# Patient Record
Sex: FEMALE | Race: AMERICAN INDIAN OR ALASKA NATIVE | NOT HISPANIC OR LATINO | Employment: FULL TIME | URBAN - METROPOLITAN AREA
[De-identification: names, ages, dates, MRNs, and addresses within clinical notes are randomized per-mention and may not be internally consistent; named-entity substitution may affect disease eponyms.]

---

## 2017-03-23 ENCOUNTER — HOSPITAL ENCOUNTER (OUTPATIENT)
Dept: RADIOLOGY | Facility: MEDICAL CENTER | Age: 37
End: 2017-03-23
Attending: ORTHOPAEDIC SURGERY
Payer: COMMERCIAL

## 2017-03-23 VITALS — OXYGEN SATURATION: 95 % | RESPIRATION RATE: 20 BRPM | HEART RATE: 78 BPM

## 2017-03-23 DIAGNOSIS — Q74.0: ICD-10-CM

## 2017-03-23 LAB — HCG SERPL QL: NEGATIVE

## 2017-03-23 PROCEDURE — A9579 GAD-BASE MR CONTRAST NOS,1ML: HCPCS | Performed by: RADIOLOGY

## 2017-03-23 PROCEDURE — 84703 CHORIONIC GONADOTROPIN ASSAY: CPT

## 2017-03-23 PROCEDURE — 73222 MRI JOINT UPR EXTREM W/DYE: CPT | Mod: RT

## 2017-03-23 PROCEDURE — 700117 HCHG RX CONTRAST REV CODE 255: Performed by: RADIOLOGY

## 2017-03-23 PROCEDURE — 25246 INJECTION FOR WRIST X-RAY: CPT

## 2017-03-23 PROCEDURE — 73115 CONTRAST X-RAY OF WRIST: CPT

## 2017-03-23 RX ORDER — SODIUM CHLORIDE 9 MG/ML
500 INJECTION, SOLUTION INTRAVENOUS PRN
Status: DISCONTINUED | OUTPATIENT
Start: 2017-03-23 | End: 2017-03-24 | Stop reason: HOSPADM

## 2017-03-23 RX ADMIN — GADODIAMIDE 0.1 ML: 287 INJECTION INTRAVENOUS at 09:45

## 2017-03-23 RX ADMIN — IOHEXOL 3 ML: 300 INJECTION, SOLUTION INTRAVENOUS at 09:45

## 2017-03-23 NOTE — PROGRESS NOTES
IR Progress Note:    Qualitative beta HCG drawn and resulted prior to procedure. Right wrist arthrogram by Dr. Benjamin. Pt tolerated procedure well and remained hemodynamically stable throughout. Pt escorted to MRI.

## 2017-05-02 ENCOUNTER — HOSPITAL ENCOUNTER (OUTPATIENT)
Dept: RADIOLOGY | Facility: MEDICAL CENTER | Age: 37
End: 2017-05-02
Attending: PHYSICAL MEDICINE & REHABILITATION
Payer: COMMERCIAL

## 2017-05-02 DIAGNOSIS — M54.42 LEFT-SIDED LOW BACK PAIN WITH LEFT-SIDED SCIATICA, UNSPECIFIED CHRONICITY: ICD-10-CM

## 2017-05-02 PROCEDURE — 72148 MRI LUMBAR SPINE W/O DYE: CPT

## 2017-08-16 ENCOUNTER — OFFICE VISIT (OUTPATIENT)
Dept: NEUROLOGY | Facility: MEDICAL CENTER | Age: 37
End: 2017-08-16
Payer: COMMERCIAL

## 2017-08-16 VITALS
TEMPERATURE: 98 F | HEIGHT: 68 IN | SYSTOLIC BLOOD PRESSURE: 124 MMHG | OXYGEN SATURATION: 96 % | WEIGHT: 293 LBS | HEART RATE: 103 BPM | DIASTOLIC BLOOD PRESSURE: 80 MMHG | BODY MASS INDEX: 44.41 KG/M2 | RESPIRATION RATE: 16 BRPM

## 2017-08-16 DIAGNOSIS — E66.01 MORBID OBESITY, UNSPECIFIED OBESITY TYPE (HCC): ICD-10-CM

## 2017-08-16 PROCEDURE — 99204 OFFICE O/P NEW MOD 45 MIN: CPT | Performed by: NURSE PRACTITIONER

## 2017-08-16 RX ORDER — AMOXICILLIN AND CLAVULANATE POTASSIUM 875; 125 MG/1; MG/1
TABLET, FILM COATED ORAL
COMMUNITY
Start: 2017-05-22 | End: 2017-08-16

## 2017-08-16 RX ORDER — IBUPROFEN 200 MG
200 TABLET ORAL EVERY 6 HOURS PRN
COMMUNITY
End: 2018-05-14

## 2017-08-16 RX ORDER — IBUPROFEN 800 MG/1
800 TABLET ORAL EVERY 8 HOURS PRN
Qty: 30 TAB | Refills: 2 | Status: SHIPPED | OUTPATIENT
Start: 2017-08-16 | End: 2018-11-14

## 2017-08-16 RX ORDER — ZOLMITRIPTAN 5 MG/1
TABLET, ORALLY DISINTEGRATING ORAL
Qty: 9 TAB | Refills: 5 | Status: SHIPPED | OUTPATIENT
Start: 2017-08-16 | End: 2018-05-14 | Stop reason: SDUPTHER

## 2017-08-16 ASSESSMENT — ENCOUNTER SYMPTOMS
VOMITING: 0
STRIDOR: 1
SORE THROAT: 1
PHOTOPHOBIA: 1
COUGH: 0
NAUSEA: 1
DOUBLE VISION: 0
ABDOMINAL PAIN: 0
DEPRESSION: 1
MUSCULOSKELETAL NEGATIVE: 1
HEADACHES: 1
SEIZURES: 0
NERVOUS/ANXIOUS: 0
DIARRHEA: 0

## 2017-08-16 ASSESSMENT — PATIENT HEALTH QUESTIONNAIRE - PHQ9: CLINICAL INTERPRETATION OF PHQ2 SCORE: 0

## 2017-08-16 NOTE — PROGRESS NOTES
"Subjective:      Froy Acosta is a 36 y.o. female who presents with New Patient for Headaches.        HPI  History of \"stress headaches\".    Family history: maternal aunt, daughter complains of headaches.    Prophylaxis: none  Abortive treatment: ibuprofen    Tries to eat, cool cloth to the head, then rests.    Typical headache: will start to hurt around the right eye and back into the temple.  Her right eye will droop, vision blurred.  Some nausea.  Photophobia, no phonophobia.    Occuring about one time every 3 months.    Trigger: usually at work.    Childhood: possible overinjestion of Benadryl    Medical history: History of left Bell's Palsy three years ago. Lower back pain.  Right wrist inflammation-- possible nerve damage. Type 2 DM-- does not take metformin due to diarrhea.  Strep throat x 4 this year.    Tearful during this appointment.    Surgical history: cholecystectomy 1999.    Lives in [place].   at the [employer].  Has a friend that she relies on.    Head CT in 2014:  Impression      1. No evidence of acute cerebral infarction, hemorrhage or mass lesion.       Current Outpatient Prescriptions   Medication Sig Dispense Refill   • MELOXICAM PO Take  by mouth.     • ibuprofen (MOTRIN) 200 MG Tab Take 200 mg by mouth every 6 hours as needed.     • amoxicillin-clavulanate (AUGMENTIN) 875-125 MG Tab      • metformin (GLUCOPHAGE) 500 MG TABS Take 500 mg by mouth 2 times a day, with meals.       No current facility-administered medications for this visit.         Review of Systems   Constitutional: Positive for malaise/fatigue.   HENT: Positive for sore throat (recent strep throat infection). Negative for hearing loss and nosebleeds.         No recent head injury.   Eyes: Positive for photophobia. Negative for double vision.        No new loss of vision.   Respiratory: Positive for stridor. Negative for cough.         No recent lung infections.   Cardiovascular: Negative for chest pain. " "  Gastrointestinal: Positive for nausea. Negative for vomiting, abdominal pain and diarrhea.   Genitourinary: Negative.    Musculoskeletal: Negative.    Skin: Negative.    Neurological: Positive for headaches. Negative for seizures.   Endo/Heme/Allergies:        No history of endocrine dysfunction.  No new problems.   Psychiatric/Behavioral: Positive for depression. The patient is not nervous/anxious.         No recent mood changes.          Objective:     /80 mmHg  Pulse 103  Temp(Src) 36.7 °C (98 °F)  Resp 16  Ht 1.727 m (5' 8\")  Wt 144.244 kg (318 lb)  BMI 48.36 kg/m2  SpO2 96%     Physical Exam   Constitutional: She is oriented to person, place, and time. She appears well-developed and well-nourished. No distress.   Morbidly obese   HENT:   Head: Normocephalic and atraumatic.   Nose: Nose normal.   Glasses full-time     Eyes: Pupils are equal, round, and reactive to light.   Neck: Normal range of motion.   Cardiovascular: Normal rate and regular rhythm.  Exam reveals no gallop and no friction rub.    No murmur heard.  Pulmonary/Chest: Effort normal and breath sounds normal. No respiratory distress.   Musculoskeletal: Normal range of motion.   Lymphadenopathy:     She has no cervical adenopathy.   Neurological: She is alert and oriented to person, place, and time. Gait normal.   Right-handed, pleasant.    CN II: Fundi normal, visual fields full to confrontation.  CN III, IV, VI: Pupils equal, round, and reactive to light.  Extraocular movements full and intact in horizontal and vertical gaze.  CN V: Normal in motor and sensory modalities.  CN VII: No evidence of facial asymmetry.  CN VIII: Hearing grossly intact.  CN IX, X: Palate elevates symmetrically and in the midline.  CN XI: Normal sternocleidomastoid strength.  CN XII: Tongue is in the midline.    Motor: Normal muscle bulk and tone, with full and symmetric strength.  Sensory: Intact to light touch, pinprick, vibration, proprioception, and " graphesthesia.  DTR's: 2+ throughout with flexor plantar responses.  Cerebellar/Coordination: Normal finger to finger, finger-tapping, rapid alternating movements, and foot tapping.  Gait: Normal casual gait.  Walks well on heels and toes, as well as in tandem gait.   Skin: Skin is warm and dry.   Psychiatric: She has a normal mood and affect.            Assessment/Plan:     Migraine headaches:  Long standing history of headaches.  Occuring about one time every 3 months.  Unrelieved by OTC medications.    Neurological examination is normal and non-focal.  She is morbidly obese.    Discussed migraines and migraine management.  Discussed importance of eating routinely, staying well hydrated, a consistent sleep routine and exercise.    She does not qualify for a daily prophylaxis however we did discuss the usage of amitriptyline and gabapentin for relief of her various neuropathy pains.    Discussed treatment options which at this time will include abortive treatment.      Discussed usage, side effects and benefits of triptans.  Prescription printed for Zomig ZMT, my second choice is Relpax.    Okay to combine triptan with ibuprofen 800mg at onset of headache.  Prescription is printed.  Consider drinking a small amount of caffeine at onset as well.    Suggested starting magnesium 500-600mg per day.    Return for follow-up in within one year.

## 2017-08-16 NOTE — MR AVS SNAPSHOT
"        Froy Edgar Karla   2017 10:20 AM   Office Visit   MRN: [id number]    Department:  [facility]   Dept Phone:  [phone]    Description:  Female : 1980   Provider:  DANIEL Salazar           Reason for Visit     New Patient Migraines      Allergies as of 2017     No Known Allergies      You were diagnosed with     Chronic migraine   [[id number]]         Vital Signs     Blood Pressure Pulse Temperature Respirations Height Weight    124/80 mmHg 103 36.7 °C (98 °F) 16 1.727 m (5' 8\") 144.244 kg (318 lb)    Body Mass Index Oxygen Saturation Smoking Status             48.36 kg/m2 96% Light Tobacco Smoker         Basic Information     Date Of Birth Sex Race Ethnicity Preferred Language    1980 Female  or  Non- English      Problem List              ICD-10-CM Priority Class Noted - Resolved    Bell's palsy G51.0   2014 - Present    Numbness R20.0   2014 - Present    Ear pain H92.09   2014 - Present      Health Maintenance        Date Due Completion Dates    IMM HEP B VACCINE (1 of 3 - Primary Series) 1979 ---    IMM DTaP/Tdap/Td Vaccine (1 - Tdap) 1998 ---    PAP SMEAR 2000 ---    IMM INFLUENZA (1) 2017 ---            Current Immunizations     No immunizations on file.      Below and/or attached are the medications your provider expects you to take. Review all of your home medications and newly ordered medications with your provider and/or pharmacist. Follow medication instructions as directed by your provider and/or pharmacist. Please keep your medication list with you and share with your provider. Update the information when medications are discontinued, doses are changed, or new medications (including over-the-counter products) are added; and carry medication information at all times in the event of emergency situations     Allergies:  No Known Allergies          Medications  Valid as of: 2017 " - 11:07 AM    Generic Name Brand Name Tablet Size Instructions for use    Ibuprofen (Tab) MOTRIN 200 MG Take 200 mg by mouth every 6 hours as needed.        Ibuprofen (Tab) MOTRIN 800 MG Take 1 Tab by mouth every 8 hours as needed.        Meloxicam   Take  by mouth.        ZOLMitriptan (TABLET DISPERSIBLE) ZOMIG-ZMT 5 MG Take 1/2-1 tablet po at onset of headache, may repeat dose in 2 hours if unrelieved.  Do not exceed more than 2 tablets in 24 hours.        .                 Medicines prescribed today were sent to:     [id number] - [place], [place] - [address]    [address] [place] [address]    Phone: [phone] Fax: [phone]    Open 24 Hours?: No    [facility] [address] [place]    [address] [place] [id number]    Phone: [phone]    Hours: 8-4:30 pm    Open 24 Hours?: No      Medication refill instructions:       If your prescription bottle indicates you have medication refills left, it is not necessary to call your provider’s office. Please contact your pharmacy and they will refill your medication.    If your prescription bottle indicates you do not have any refills left, you may request refills at any time through one of the following ways: The online [employer] system (except Urgent Care), by calling your provider’s office, or by asking your pharmacy to contact your provider’s office with a refill request. Medication refills are processed only during regular business hours and may not be available until the next business day. Your provider may request additional information or to have a follow-up visit with you prior to refilling your medication.   *Please Note: Medication refills are assigned a new Rx number when refilled electronically. Your pharmacy may indicate that no refills were authorized even though a new prescription for the same medication is available at the pharmacy. Please request the medicine by  name with the pharmacy before contacting your provider for a refill.        Instructions    1) Coffee or Coke, 2-3 oz  2) Aleve or Ibuprofen 800mg   3) New Zomig, melt-away tablet.            [employer] Access Code: [email or web address]  Expires: 9/15/2017 10:11 AM    [employer]  A secure, online tool to manage your health information     [employer]® is a secure, online tool that connects you to your personalized health information from the privacy of your home -- day or night - making it very easy for you to manage your healthcare. Once the activation process is completed, you can even access your medical information using the [employer] nav, which is available for free in the Apple Nav store or Google Play store.     [employer] provides the following levels of access (as shown below):   My Chart Features   [facility] Doctor [facility]  Urgent  Care Non-[facility]  Primary Care  Doctor   Email your healthcare team securely and privately 24/7 X X X    Manage appointments: schedule your next appointment; view details of past/upcoming appointments X      Request prescription refills. X      View recent personal medical records, including lab and immunizations X X X X   View health record, including health history, allergies, medications X X X X   Read reports about your outpatient visits, procedures, consult and ER notes X X X X   See your discharge summary, which is a recap of your hospital and/or ER visit that includes your diagnosis, lab results, and care plan. X X       How to register for [employer]:  1. Go to  [email or web address].  2. Click on the Sign Up Now box, which takes you to the New Member Sign Up page. You will need to provide the following information:  a. Enter your [employer] Access Code exactly as it appears at the top of this page. (You will not need to use this code after you’ve completed the sign-up process. If you do not sign up before the expiration date, you must request a  new code.)   b. Enter your date of birth.   c. Enter your home email address.   d. Click Submit, and follow the next screen’s instructions.  3. Create a [employer] ID. This will be your [employer] login ID and cannot be changed, so think of one that is secure and easy to remember.  4. Create a [employer] [facility] [employer] password. You can change your password at any time.  5. Enter your Password Reset Question and Answer. This can be used at a later time if you forget your password.   6. Enter your e-mail address. This allows you to receive e-mail notifications when new information is available in [employer].  7. Click Sign Up. You can now view your health information.    For assistance activating your [employer] account, call [phone]        Quit Tobacco Information     Do you want to quit using tobacco?    Quitting tobacco decreases risks of cancer, heart and lung disease, increases life expectancy, improves sense of taste and smell, and increases spending money, among other benefits.    If you are thinking about quitting, we can help.  • [facility] Quit Tobacco Program: [phone]  o Program occurs weekly for four weeks and includes pharmacist consultation on products to support quitting smoking or chewing tobacco. A provider referral is needed for pharmacist consultation.  • Tobacco Users Help Hotline: 3-501-QUIT-NOW (358-3097) or https://[place].quitlogix.org/  o Free, confidential telephone and online coaching for [place] residents. Sessions are designed on a schedule that is convenient for you. Eligible clients receive free nicotine replacement therapy.  • Nationally: www.smokefree.gov  o Information and professional assistance to support both immediate and long-term needs as you become, and remain, a non-smoker. Smokefree.gov allows you to choose the help that best fits your needs.

## 2017-09-01 ENCOUNTER — TELEPHONE (OUTPATIENT)
Dept: NEUROLOGY | Facility: MEDICAL CENTER | Age: 37
End: 2017-09-01

## 2017-09-01 NOTE — TELEPHONE ENCOUNTER
Patient's pharmacy phoned today, they do not have the zomig in stock and was wondering if they could go with sumatriptan instead at this time so that patient has something. Please advise

## 2017-09-05 RX ORDER — SUMATRIPTAN 100 MG/1
TABLET, FILM COATED ORAL
Qty: 9 TAB | Refills: 5 | Status: SHIPPED | OUTPATIENT
Start: 2017-09-05 | End: 2021-01-08

## 2018-03-16 ENCOUNTER — APPOINTMENT (OUTPATIENT)
Dept: NEUROLOGY | Facility: MEDICAL CENTER | Age: 38
End: 2018-03-16
Payer: COMMERCIAL

## 2018-04-05 ENCOUNTER — APPOINTMENT (OUTPATIENT)
Dept: NEUROLOGY | Facility: MEDICAL CENTER | Age: 38
End: 2018-04-05
Payer: COMMERCIAL

## 2018-05-14 ENCOUNTER — OFFICE VISIT (OUTPATIENT)
Dept: NEUROLOGY | Facility: MEDICAL CENTER | Age: 38
End: 2018-05-14
Payer: COMMERCIAL

## 2018-05-14 VITALS
TEMPERATURE: 97 F | WEIGHT: 293 LBS | HEART RATE: 95 BPM | BODY MASS INDEX: 44.41 KG/M2 | SYSTOLIC BLOOD PRESSURE: 116 MMHG | OXYGEN SATURATION: 92 % | HEIGHT: 68 IN | DIASTOLIC BLOOD PRESSURE: 70 MMHG | RESPIRATION RATE: 16 BRPM

## 2018-05-14 PROCEDURE — 99213 OFFICE O/P EST LOW 20 MIN: CPT | Mod: 25 | Performed by: NURSE PRACTITIONER

## 2018-05-14 RX ORDER — KETOROLAC TROMETHAMINE 30 MG/ML
60 INJECTION, SOLUTION INTRAMUSCULAR; INTRAVENOUS ONCE
Status: COMPLETED | OUTPATIENT
Start: 2018-05-14 | End: 2018-05-14

## 2018-05-14 RX ORDER — ZOLMITRIPTAN 5 MG/1
TABLET, ORALLY DISINTEGRATING ORAL
Qty: 9 TAB | Refills: 5 | Status: SHIPPED | OUTPATIENT
Start: 2018-05-14 | End: 2018-11-14

## 2018-05-14 RX ADMIN — KETOROLAC TROMETHAMINE 60 MG: 30 INJECTION, SOLUTION INTRAMUSCULAR; INTRAVENOUS at 14:09

## 2018-05-14 ASSESSMENT — ENCOUNTER SYMPTOMS
NERVOUS/ANXIOUS: 0
ABDOMINAL PAIN: 0
DIARRHEA: 0
COUGH: 0
NAUSEA: 0
VOMITING: 0
SEIZURES: 0
SORE THROAT: 1
DEPRESSION: 0
MUSCULOSKELETAL NEGATIVE: 1
HEADACHES: 0
DOUBLE VISION: 0

## 2018-05-14 ASSESSMENT — PAIN SCALES - GENERAL: PAINLEVEL: 6=MODERATE PAIN

## 2018-05-14 NOTE — LETTER
May 14, 2018         Patient: Froy Acosta   YOB: 1980   Date of Visit: 5/14/2018           To Whom it May Concern:    Froy Acosta was seen in my clinic on 5/14/2018. She may return to work on 5/14/18..    If you have any questions or concerns, please don't hesitate to call.        Sincerely,           CHARMAINE Salazar.  Electronically Signed

## 2018-05-14 NOTE — PROGRESS NOTES
Subjective:      Froy Acosta is a 37 y.o. female who presents with Follow-Up (Chronic migraine)            HPI  Unable to obtain the last triptan prescription.  Last appointment was August 2018, 9 months ago.    Has been experiencing left eye pain, pressure/pain.  In this past month, she has had 2 days in a row of migraines.  Her left eye is strained and painful.  The globe itself is very painful.      She has been evaluated per ophthalmology.    Migraines are rarely occurring still as they were reported last year.    Current Outpatient Prescriptions   Medication Sig Dispense Refill   • sumatriptan (IMITREX) 100 MG tablet Take 1/2-1 tablet po at onset of headache, may repeat dose in 2 hours if unrelieved.  Do not exceed more than 2 tablets in 24 hours. 9 Tab 5   • ibuprofen (MOTRIN) 800 MG Tab Take 1 Tab by mouth every 8 hours as needed. 30 Tab 2   • zolmitriptan (ZOMIG-ZMT) 5 MG disintegrating tablet Take 1/2-1 tablet po at onset of headache, may repeat dose in 2 hours if unrelieved.  Do not exceed more than 2 tablets in 24 hours. 9 Tab 5     No current facility-administered medications for this visit.        Review of Systems   HENT: Positive for sore throat (recovering from strep throat). Negative for hearing loss and nosebleeds.         No recent head injury.   Eyes: Negative for double vision.        No new loss of vision.   Respiratory: Negative for cough.         No recent lung infections.   Cardiovascular: Negative for chest pain.   Gastrointestinal: Negative for abdominal pain, diarrhea, nausea and vomiting.   Genitourinary: Negative.    Musculoskeletal: Negative.    Skin: Negative.    Neurological: Negative for seizures and headaches.   Endo/Heme/Allergies:        No history of endocrine dysfunction.  No new problems.   Psychiatric/Behavioral: Negative for depression. The patient is not nervous/anxious.         No recent mood changes.          Objective:     /70   Pulse 95   Temp 36.1 °C  "(97 °F)   Resp 16   Ht 1.727 m (5' 8\")   Wt (!) 143.2 kg (315 lb 11.2 oz)   SpO2 92%   BMI 48.00 kg/m²      Physical Exam   Constitutional: She is oriented to person, place, and time. She appears well-developed and well-nourished. No distress.   Morbid obesity     HENT:   Head: Normocephalic and atraumatic.   Eyes: Pupils are equal, round, and reactive to light.   Wears glasses full-time     Neck: Normal range of motion.   Cardiovascular: Normal rate and regular rhythm.    Pulmonary/Chest: Effort normal and breath sounds normal. No respiratory distress.   Musculoskeletal: Normal range of motion.   Neurological: She is alert and oriented to person, place, and time. She has normal strength and normal reflexes. No cranial nerve deficit. She exhibits normal muscle tone. Coordination normal.   No observable changes in neurologic status.  See initial new patient examination for details.     Skin: Skin is warm and dry.   Psychiatric: She has a normal mood and affect.           Assessment/Plan:     Migraine headaches:  Long standing history of headaches.  Occuring about one time every 3 months.  Unrelieved by OTC medications.     Prescription sent to [facility] Zomig ZMT, my second choice is Relpax.    Okay to combine triptan with ibuprofen 800mg at onset of headache.  Prescription is printed.  Consider drinking a small amount of caffeine at onset as well.     Suggested starting magnesium 500-600mg per day.     Toradol 60mg IM provided per MA.    Return for follow-up in within 6 months.    "

## 2018-08-03 ENCOUNTER — SLEEP CENTER VISIT (OUTPATIENT)
Dept: SLEEP MEDICINE | Facility: MEDICAL CENTER | Age: 38
End: 2018-08-03
Payer: COMMERCIAL

## 2018-08-03 VITALS
WEIGHT: 293 LBS | OXYGEN SATURATION: 93 % | DIASTOLIC BLOOD PRESSURE: 84 MMHG | BODY MASS INDEX: 44.41 KG/M2 | HEIGHT: 68 IN | HEART RATE: 84 BPM | RESPIRATION RATE: 15 BRPM | SYSTOLIC BLOOD PRESSURE: 132 MMHG

## 2018-08-03 DIAGNOSIS — G47.30 SLEEP DISORDER BREATHING: ICD-10-CM

## 2018-08-03 DIAGNOSIS — E66.01 MORBID OBESITY WITH BMI OF 45.0-49.9, ADULT (HCC): ICD-10-CM

## 2018-08-03 PROCEDURE — 99203 OFFICE O/P NEW LOW 30 MIN: CPT | Performed by: FAMILY MEDICINE

## 2018-08-03 RX ORDER — MELOXICAM 15 MG/1
TABLET ORAL
COMMUNITY
End: 2021-01-08

## 2018-08-03 RX ORDER — TRAMADOL HYDROCHLORIDE 50 MG/1
50 TABLET ORAL EVERY 4 HOURS PRN
COMMUNITY
End: 2018-12-03

## 2018-08-03 NOTE — PROGRESS NOTES
"     [facility]  Consult Note     Date: 8/3/2018 / Time: 8:39 AM    Patient ID:   Name:             Froy Acosta   YOB: 1980  Age:                 37 y.o.  female   MRN:               [id number]      Thank you for requesting a sleep medicine consultation on Froy Acosta at the sleep center. She presents today with the chief complaints of snoring,gasoing/choking and excessive daytime sleepiness. She is referred by Dr. Newell for evaluation and treatment of sleep disorder breathing .     HISTORY OF PRESENT ILLNESS:       At night,  Froy Acosta goes to bed around 10-11 pm on weekdays and on weekends. She gets out of bed at 4-5 am on weekdays and on weekends.  She  averages 6 hrs of sleep on a good night and 5 hrs on a bad night. Pt has bad nights most nights per week. She falls asleep within few min to 15 minutes. She awakens 1-2 times a night due to no obvious reason. It takes her 60 min to fall back asleep. During this time she lays in bed. During this time she worries about work/ why cant she fall asleep.     She is aware of snoring/witnessed apneas/gasping or choking in sleep.  She  denies any symptoms of restless legs syndrome such as an \"urge to move\"  She  legs in the evening or bedtime. She  denies any symptoms of narcolepsy such as sleep paralysis or cataplexy, or any symptoms to suggest parasomnias such as sleep walking or acting out of dreams. She  has not used any medications for her sleep problem.    Overall,  She doesnot finds her sleep refreshing. When She  wakes up in the morning, She does feel tired. In terms of  excessive daytime sleepiness,  She complains of sleepiness  watching TV and occasionally while driving. [place] sleepiness scale score is abnormal at  17/21. She does take regular naps.   REVIEW OF SYSTEMS:       Constitutional: Denies fevers, Denies weight changes  Eyes: Denies changes in vision, no eye pain  Ears/Nose/Throat/Mouth: Denies nasal " congestion or sore throat   Cardiovascular: Denies chest pain or palpitations   Respiratory: Denies shortness of breath , Denies cough  Gastrointestinal/Hepatic: Denies abdominal pain, nausea, vomiting, diarrhea, constipation or GI bleeding   Genitourinary: Denies bladder dysfunction, dysuria or frequency  Musculoskeletal/Rheum: Denies  joint pain and swelling   Skin/Breast: Denies rash, denies breast lumps or discharge  Neurological: Denies headache, confusion, memory loss or focal weakness/parasthesias  Psychiatric: denies mood disorder     Comprehensive review of systems form is reviewed with the patient and is attached in the EMR.     PMH:  has a past medical history of Back pain; Chickenpox; Degenerative disc disease; Obesity; and Type II or unspecified type diabetes mellitus without mention of complication, not stated as uncontrolled.  MEDS:   Current Outpatient Prescriptions:   •  meloxicam (MOBIC) 15 MG tablet, meloxicam 15 mg tablet, Disp: , Rfl:   •  ibuprofen (MOTRIN) 800 MG Tab, Take 1 Tab by mouth every 8 hours as needed., Disp: 30 Tab, Rfl: 2  •  tramadol (ULTRAM) 50 MG Tab, Take 50 mg by mouth every four hours as needed., Disp: , Rfl:   •  zolmitriptan (ZOMIG-ZMT) 5 MG disintegrating tablet, Take 1/2-1 tablet po at onset of headache, may repeat dose in 2 hours if unrelieved.  Do not exceed more than 2 tablets in 24 hours., Disp: 9 Tab, Rfl: 5  •  sumatriptan (IMITREX) 100 MG tablet, Take 1/2-1 tablet po at onset of headache, may repeat dose in 2 hours if unrelieved.  Do not exceed more than 2 tablets in 24 hours., Disp: 9 Tab, Rfl: 5  ALLERGIES: No Known Allergies  SURGHX:   Past Surgical History:   Procedure Laterality Date   • CHOLECYSTECTOMY       SOCHX:  reports that she has been smoking Cigars.  She has never used smokeless tobacco. She reports that she drinks alcohol. She reports that she uses drugs, including Marijuana..   FH:   Family History   Problem Relation Age of Onset   • Other Mother   "       lupus   • Other Father         accident   • Sleep Apnea Neg Hx            Physical Exam:  Vitals/ General Appearance:   Weight/BMI: Body mass index is 48.5 kg/m².  Blood pressure 132/84, pulse 84, resp. rate 15, height 1.727 m (5' 8\"), weight (!) 144.7 kg (319 lb), SpO2 93 %.  Vitals:    08/03/18 0826   BP: 132/84   Pulse: 84   Resp: 15   SpO2: 93%   Weight: (!) 144.7 kg (319 lb)   Height: 1.727 m (5' 8\")       Pt. is alert and oriented to time, place and person. Cooperative and in no apparent distress.       1. Head: Atraumatic, normocephalic.   2. Ears: Normal tympanic membrane and no discharge  3. Nose: No inferior turbinate hypertophy, no septal deviation, no polyp.   4. Throat: Oropharynx appears crowded in that the palate is overhanging ([place] scale 3. Tonsils are 3+, uvula is large, pharynx not inflamed. Tongue is large. has intact dentition and oral hygiene is fair.  5. Neck: Supple. No thyromegaly  6. Chest: Trachea central  7. Lungs auscultation: B/L good air entry, vesicular breath sounds, no wheezing  8. Heart auscultation: 1st and 2nd heart sounds normal, regular rhythm. No  murmur.  9. Abdomen: Soft, non tender, no organomegaly. Bowel sounds present  10. Extremities: no pedal edema.   Peripheral pulses felt.  11. Skin: No rash  12. NEUROLOGICAL EXAMINATION: On neurological exam, the patient was alert and oriented x3. speech was clear and fluent without dysarthria.     INVESTIGATIONS:       ASSESSMENT AND PLAN     1. She  has symptoms of Obstructive Sleep Apnea (JONAH). She  has excessive daytime sleepiness that interferes with activites of daily living. She  risk factors for JONAH include obesity, thick neck, and crowded oropharynx.     The pathophysiology of JONAH and the increased risk of cardiovascular morbidity from untreated JONAH is discussed in detail with the patient.      We have discussed diagnostic options including in-laboratory, attended polysomnography and home sleep testing. We have " also discussed treatment options including airway pressurization, reconstructive otolaryngologic surgery, dental appliances and weight management.       Subsequently,treatment options will be discussed based on the diagnostic study. Meanwhile, She is urged to avoid supine sleep, weight gain and alcoholic beverages since all of these can worsen JONAH. She is cautioned against drowsy driving. If She feels sleepy while driving, She must pull over for a break/nap, rather than persist on the road, in the interest of She own safety and that of others on the road.    Plan  -  She  will be scheduled for an overnight PSG to assess sleep related breathing disorder.    2. Patient's body mass index is 48.5 kg/m². Exercise and nutrition counseling were performed at this visit.    - recommended healthy diet with portion control , aerobic exercise 5x week  - obesity counseling done today: Drink more water. Reduce carb intake in drinks. Try to eat 3 meals a day with last meal 4-6 hours prior to bedtime. Limit caloric intake to 1600 - 1800 kcal per day.Restrict carbohydrate intake to 50 g per day to 100 g per day    3. Regarding treatment of other past medical problems and general health maintenance,  She is urged to follow up with PCP.

## 2018-08-23 ENCOUNTER — SLEEP STUDY (OUTPATIENT)
Dept: SLEEP MEDICINE | Facility: MEDICAL CENTER | Age: 38
End: 2018-08-23
Attending: FAMILY MEDICINE
Payer: COMMERCIAL

## 2018-08-23 DIAGNOSIS — G47.30 SLEEP DISORDER BREATHING: ICD-10-CM

## 2018-08-23 PROCEDURE — 95811 POLYSOM 6/>YRS CPAP 4/> PARM: CPT | Performed by: INTERNAL MEDICINE

## 2018-08-24 NOTE — PROCEDURES
Clinical Comments:    The patient underwent a split night polysomnogram with a CPAP titration using the standard montage for measurement of paramaters of sleep, respiratory events, movement abnormalities, heart rate and rhythm.  A Microphone was used to monitior snoring.  Interpretation:  Study start time was 08:48:19 PM.  Total recording time was 3h 38.5m (218 minutes) with a total sleep time of 2h 41.0m (161 minutes) resulting in a sleep efficiency of 73.68%.  Sleep latency from the start fo the study was 00 minutes minutes and REM latency from sleep onset was 182 minutes minutes.  Respiratory:   There were 56 apneas in total consisting of 3 obstructive apneas, 0 mixed apneas, and 53 central apneas.  There were 184 hypopneas in total.  The apnea index was 20.87 per hour and the hypopnea index was 68.57 per hour.  The overall AHI was 89.4, with a REM AHI of 103.45, and a supine AHI of 96.31.  Limb Movements:  There were a total of 77 periodic leg movements, of which 30 were PLMS arousals.  This resulted in a PLMS index of 28.7 and a PLMS arousal index of 11.2  Oximetry:  The mean SaO2 was 87.0% for the diagnostic portion of the study, with a minimum SaO2 of 77.0%.    Treatment:  Interpretation:  Treatment recording time was 3h 16.0m (196 minutes) with a total sleep time of 2h 42.5m (162 minutes) resulting in a sleep efficiency of 82.9%.    Sleep latency from the start of treatment was 00 minutes minutes and REM latency from sleep onset was 0h 53.0m minutes.    The patient had 60 arousals in total for an arousal index of 22.2.  Respiratory:   There were 52 apneas in total consisting of  0 obstructive apneas, 52 central apneas, and 0 mixed apneas for an apnea index of 19.20.    The patient had 23 hypopneas in total, which resulted in a hypopnea index of 8.49.    The overall AHI was 27.69, with a REM AHI of 16.31, and a supine AHI of 27.69.     Limb Movements:  There were a total of 0 periodic leg movements, of which  0 were PLMS arousals.  This resulted in a PLMS index of 0.0 and a PLMS arousal index of 0.0.  Oximetry:  The mean SaO2 during treatment was 91.0%, with a minimum oxygen saturation of 81.0%.  CPAP was tried from 6cm H2O to 14cm H2O.  Bipap was tried at 12/8cm H2O    On the baseline component of the study sleep efficiency was reduced at 74%.  Sleep architecture showed reduced stage III and REM sleep with stage I: 20%, stage II: 66%, stage III: 5%, and REM sleep: 9%, of the night.  There was reduced sleep latency.  There were 119 arousals with index of 44.  There are elevated periodic limb movements with index 22/h.  No EKG abnormalities were appreciated.    Once asleep frequent snoring was noted associated with obstructive respiratory hypopneas.  The overall AHI was 89 events per hour, and associated with oxygen desaturations below 90% for 79% of the night, to a nathaly of 77%.    After meeting split-night criteria CPAP therapy was started at 6 cm of water and titrated to 14 cm of water.  Due to central apneas, she was switched to bilevel therapy 12/8 cm of water.There were persistent obstructive respiratory events at all pressure settings with optimal treatment at CPAP: 9 cm of water.    Interpretation:  Severe obstructive sleep apnea syndrome, AHI: 89/h, associated with desaturations to 77% SPO2.  Technically difficult CPAP titration with induced central apneas at higher airway pressures.    Recommendations:  Due to the severity of JONAH, CPAP therapy is recommended for treatment.  A designated full night CPAP titration polysomnogram is advised.  Alternatively a trial on AutoPap:8-12 cm H20 could be considered using a small Simplus mask, with close follow-up to monitor response to treatment.  Weight loss secondary to BMI: 48.  Avoidance of alcohol, sedatives and muscle relaxants as these can exacerbate sleep apnea.

## 2018-10-18 ENCOUNTER — HOSPITAL ENCOUNTER (OUTPATIENT)
Dept: RADIOLOGY | Facility: MEDICAL CENTER | Age: 38
End: 2018-10-18
Attending: PHYSICAL MEDICINE & REHABILITATION
Payer: COMMERCIAL

## 2018-10-18 DIAGNOSIS — M54.42 ACUTE BACK PAIN WITH SCIATICA, LEFT: ICD-10-CM

## 2018-10-18 PROCEDURE — 72148 MRI LUMBAR SPINE W/O DYE: CPT

## 2018-11-02 ENCOUNTER — SLEEP CENTER VISIT (OUTPATIENT)
Dept: SLEEP MEDICINE | Facility: MEDICAL CENTER | Age: 38
End: 2018-11-02
Payer: COMMERCIAL

## 2018-11-02 VITALS
RESPIRATION RATE: 15 BRPM | HEIGHT: 67 IN | HEART RATE: 83 BPM | SYSTOLIC BLOOD PRESSURE: 118 MMHG | BODY MASS INDEX: 45.99 KG/M2 | OXYGEN SATURATION: 95 % | WEIGHT: 293 LBS | DIASTOLIC BLOOD PRESSURE: 72 MMHG

## 2018-11-02 DIAGNOSIS — G47.30 SEVERE SLEEP APNEA: ICD-10-CM

## 2018-11-02 PROCEDURE — 99213 OFFICE O/P EST LOW 20 MIN: CPT | Performed by: FAMILY MEDICINE

## 2018-11-02 NOTE — PROGRESS NOTES
[facility] Follow Up Note     Date: 11/2/2018 / Time: 8:25 AM    Patient ID:   Name:             Froy Acosta   YOB: 1980  Age:                 38 y.o.  female   MRN:               [id number]      Thank you for requesting a sleep medicine consultation on Froy Acosta at the sleep center. She presents today with the chief complaints of JONAH and SS follow up.     HISTORY OF PRESENT ILLNESS:       Pt is currently not on PAP therapy. No change in sleep schedule. She goes to sleep around 10 pm and wakes up around 5 am. She is getting about 6 hrs of sleep on a good night and about 4-5 hr of sleep on a bad night. The bad nights are about 3-5 per week.The symptoms of excessive daytime, snoring and gasping has continued.     Severe obstructive sleep apnea syndrome, AHI: 89/h, associated with desaturations to 77% SPO2.  CPAP therapy was started at 6 cm of water and titrated to 14 cm of water.  Due to central apneas, she was switched to bilevel therapy 12/8 cm of water.        REVIEW OF SYSTEMS:       Constitutional: Denies fevers, Denies weight changes  Eyes: Denies changes in vision, no eye pain  Ears/Nose/Throat/Mouth: Denies nasal congestion or sore throat   Cardiovascular: Denies chest pain or palpitations   Respiratory: Denies shortness of breath , Denies cough  Gastrointestinal/Hepatic: Denies abdominal pain, nausea, vomiting, diarrhea, constipation or GI bleeding   Genitourinary: Denies bladder dysfunction, dysuria or frequency   Skin/Breast: Denies rash,   Neurological: Denies headache, confusion, memory loss or focal weakness/parasthesias  Psychiatric: denies mood disorder   Sleep: + snoring and EDS     Comprehensive review of systems form is reviewed with the patient and is attached in the EMR.     PMH:  has a past medical history of Back pain; Chickenpox; Degenerative disc disease; Obesity; and Type II or unspecified type diabetes mellitus without mention of  "complication, not stated as uncontrolled.  MEDS:   Current Outpatient Prescriptions:   •  meloxicam (MOBIC) 15 MG tablet, meloxicam 15 mg tablet, Disp: , Rfl:   •  zolmitriptan (ZOMIG-ZMT) 5 MG disintegrating tablet, Take 1/2-1 tablet po at onset of headache, may repeat dose in 2 hours if unrelieved.  Do not exceed more than 2 tablets in 24 hours., Disp: 9 Tab, Rfl: 5  •  sumatriptan (IMITREX) 100 MG tablet, Take 1/2-1 tablet po at onset of headache, may repeat dose in 2 hours if unrelieved.  Do not exceed more than 2 tablets in 24 hours., Disp: 9 Tab, Rfl: 5  •  ibuprofen (MOTRIN) 800 MG Tab, Take 1 Tab by mouth every 8 hours as needed., Disp: 30 Tab, Rfl: 2  •  tramadol (ULTRAM) 50 MG Tab, Take 50 mg by mouth every four hours as needed., Disp: , Rfl:   ALLERGIES: No Known Allergies  SURGHX:   Past Surgical History:   Procedure Laterality Date   • CHOLECYSTECTOMY       SOCHX:  reports that she has been smoking Cigars.  She has never used smokeless tobacco. She reports that she drinks alcohol. She reports that she uses drugs, including Marijuana..  FH:   Family History   Problem Relation Age of Onset   • Other Mother         lupus   • Other Father         accident   • Sleep Apnea Neg Hx          Physical Exam:  Vitals/ General Appearance:   Weight/BMI: Body mass index is 51.06 kg/m².  Blood pressure 118/72, pulse 83, resp. rate 15, height 1.702 m (5' 7\"), weight (!) 147.9 kg (326 lb), SpO2 95 %, not currently breastfeeding.  Vitals:    11/02/18 0820   BP: 118/72   BP Location: Right arm   Patient Position: Sitting   BP Cuff Size: Adult   Pulse: 83   Resp: 15   SpO2: 95%   Weight: (!) 147.9 kg (326 lb)   Height: 1.702 m (5' 7\")       Pt. is alert and oriented to time, place and person. Cooperative and in no apparent distress.       1. Head: Atraumatic, normocephalic.   2. Ears: Normal tympanic membrane and no discharge  3. Nose: No inferior turbinate hypertophy, no septal deviation  4. Throat: Oropharynx appears " crowded in that the palate is overhanging   5. Neck: Supple. No thyromegaly  6. Chest: Trachea central, no spine deformity   7. Lungs auscultation: B/L good air entry, vesicular breath sounds, no adventitious sounds  8. Heart auscultation: 1st and 2nd heart sounds normal, regular rhythm. No appreciable murmur.  9. . NEUROLOGICAL EXAMINATION: On neurological exam, the patient was alert and oriented x3. speech was clear and fluent without dysarthria.      INVESTIGATIONS:           ASSESSMENT AND PLAN     1. Sleep Apnea (JONAH).    The pathophysiology of sleep anea and the increased risk of cardiovascular morbidity from untreated sleep apnea is discussed in detail with the patient.      She is urged to avoid supine sleep, weight gain and alcoholic beverages since all of these can worsen sleep apnea. She is cautioned against drowsy driving. If She feels sleepy while driving, She must pull over for a break/nap, rather than persist on the road, in the interest of She own safety and that of others on the road.   Plan   - After informed discussion BiPAP titration is ordered. She had 67% of apneas being central apneas during the  titration. Start titration at 13/9 cm      SS  compliance download was reviewed and discussed with the pt   - compliance was reinforced     2.. Regarding treatment of other past medical problems and general health maintenance,  She is urged to follow up with PCP.

## 2018-11-02 NOTE — PATIENT INSTRUCTIONS
"CPAP and BIPAP Information  CPAP and BIPAP are methods of helping you breathe with the use of air pressure. CPAP stands for \"continuous positive airway pressure.\" BIPAP stands for \"bi-level positive airway pressure.\" In both methods, air is blown into your air passages to help keep you breathing well. With CPAP, the amount of pressure stays the same while you breathe in and out. CPAP is most commonly used for obstructive sleep apnea. For obstructive sleep apnea, CPAP works by holding your airways open so that they do not collapse when your muscles relax during sleep. BIPAP is similar to CPAP except the amount of pressure is increased when you inhale. This helps you take larger breaths. Your health care provider will recommend whether CPAP or BIPAP would be more helpful for you.   WHY ARE CPAP AND BIPAP TREATMENTS USED?  CPAP or BIPAP can be helpful if you have:   · Sleep apnea.    · Chronic obstructive pulmonary disease (COPD).    · Diseases that weaken the muscles of the chest, including muscular dystrophy or neurological diseases such as amyotrophic lateral sclerosis (ALS).  · Other problems that cause breathing to be weak, abnormal, or difficult.    HOW IS CPAP OR BIPAP ADMINISTERED?  Both CPAP and BIPAP are provided by a small machine with a flexible plastic tube that attaches to a plastic mask. The mask fits on your face, and air is blown into your air passages through your nose or mouth. The amount of pressure that is used to blow the air into your air passages can be set on the machine. Your health care provider will determine the pressure setting that should be used based on your individual needs.  WHEN SHOULD CPAP OR BIPAP BE USED?  In most cases, the mask is worn only when sleeping. Generally, you will need to wear the mask throughout the night and during the daytime if you take a nap. In a few cases involving certain medical conditions, people also need to wear the mask at other times when they are awake. " Follow your health care provider's instructions for when to use the machine.   USING THE MASK  · Because the mask needs to be snug, some people feel a trapped or closed-in feeling (claustrophobic) when first using the mask. You may need to get used to the mask gradually. To do this, you can first hold the mask loosely over your nose or mouth. Gradually apply the mask more snugly. You can also gradually increase the amount of time that you use the mask.  · Masks are available in various types and sizes. Some fit over your mouth and nose, and some fit over just your nose. If your mask does not fit well, talk to your health care provider about getting a different one.  · If you are using a nasal mask and you tend to breathe through your mouth, a chin strap may be applied to help keep your mouth closed.    · The CPAP and BIPAP machines have alarms that may sound if the mask comes off or develops a leak.  · If you have trouble with the mask, it is very important that you talk to your health care provider about finding a way to make the mask easier to tolerate. Do not stop using the mask. This could have a negative impact on your health.  TIPS FOR USING THE MACHINE  · Place your CPAP or BIPAP machine on a secure table or stand near an electrical outlet.    · Know where the on-off switch is located on the machine.  · Follow your health care provider's instructions for how to set the pressure on your machine and when you should use it.  · Do not eat or drink while the CPAP or BIPAP machine is on. Food or fluids could get pushed into your lungs by the pressure of the CPAP or BIPAP.  · Do not smoke. Tobacco smoke residue can damage the machine.    · For home use, CPAP and BIPAP machines can be rented or purchased through home health care companies. Many different brands of machines are available. Renting a machine before purchasing may help you find out which particular machine works well for you.  SEEK IMMEDIATE MEDICAL CARE  IF:  · You have redness or open areas around your nose or mouth where the mask fits.    · You have trouble operating the CPAP or BIPAP machine.    · You cannot tolerate wearing the CPAP or BIPAP mask.    This information is not intended to replace advice given to you by your health care provider. Make sure you discuss any questions you have with your health care provider.  Document Released: 09/15/2005 Document Revised: 01/08/2016 Document Reviewed: 07/17/2014  Elsevier Interactive Patient Education © 2017 [employer]

## 2018-11-14 ENCOUNTER — OFFICE VISIT (OUTPATIENT)
Dept: NEUROLOGY | Facility: MEDICAL CENTER | Age: 38
End: 2018-11-14
Payer: COMMERCIAL

## 2018-11-14 VITALS
RESPIRATION RATE: 18 BRPM | SYSTOLIC BLOOD PRESSURE: 124 MMHG | OXYGEN SATURATION: 95 % | BODY MASS INDEX: 45.99 KG/M2 | HEIGHT: 67 IN | DIASTOLIC BLOOD PRESSURE: 80 MMHG | HEART RATE: 88 BPM | WEIGHT: 293 LBS | TEMPERATURE: 97.3 F

## 2018-11-14 DIAGNOSIS — E66.01 MORBID OBESITY WITH BMI OF 45.0-49.9, ADULT (HCC): ICD-10-CM

## 2018-11-14 DIAGNOSIS — G47.30 SEVERE SLEEP APNEA: ICD-10-CM

## 2018-11-14 PROCEDURE — 99213 OFFICE O/P EST LOW 20 MIN: CPT | Performed by: NURSE PRACTITIONER

## 2018-11-14 ASSESSMENT — ENCOUNTER SYMPTOMS
ABDOMINAL PAIN: 0
HEADACHES: 1
DEPRESSION: 0
NAUSEA: 0
DOUBLE VISION: 0
CONSTITUTIONAL NEGATIVE: 1
VOMITING: 0
DIARRHEA: 0
SORE THROAT: 0
SEIZURES: 0
BACK PAIN: 1
COUGH: 0
NERVOUS/ANXIOUS: 0

## 2018-12-03 DIAGNOSIS — Z01.810 PRE-OPERATIVE CARDIOVASCULAR EXAMINATION: ICD-10-CM

## 2018-12-03 DIAGNOSIS — Z01.812 PRE-OPERATIVE LABORATORY EXAMINATION: ICD-10-CM

## 2018-12-03 LAB — EKG IMPRESSION: NORMAL

## 2018-12-03 PROCEDURE — 36415 COLL VENOUS BLD VENIPUNCTURE: CPT

## 2018-12-03 PROCEDURE — 83036 HEMOGLOBIN GLYCOSYLATED A1C: CPT

## 2018-12-03 PROCEDURE — 84703 CHORIONIC GONADOTROPIN ASSAY: CPT

## 2018-12-03 PROCEDURE — 93005 ELECTROCARDIOGRAM TRACING: CPT

## 2018-12-03 PROCEDURE — 93010 ELECTROCARDIOGRAM REPORT: CPT | Performed by: INTERNAL MEDICINE

## 2018-12-03 PROCEDURE — 80048 BASIC METABOLIC PNL TOTAL CA: CPT

## 2018-12-04 LAB
ANION GAP SERPL CALC-SCNC: 9 MMOL/L (ref 0–11.9)
BUN SERPL-MCNC: 16 MG/DL (ref 8–22)
CALCIUM SERPL-MCNC: 8.8 MG/DL (ref 8.5–10.5)
CHLORIDE SERPL-SCNC: 104 MMOL/L (ref 96–112)
CO2 SERPL-SCNC: 26 MMOL/L (ref 20–33)
CREAT SERPL-MCNC: 0.95 MG/DL (ref 0.5–1.4)
EST. AVERAGE GLUCOSE BLD GHB EST-MCNC: 137 MG/DL
GLUCOSE SERPL-MCNC: 106 MG/DL (ref 65–99)
HBA1C MFR BLD: 6.4 % (ref 0–5.6)
HCG SERPL QL: NEGATIVE
POTASSIUM SERPL-SCNC: 4.3 MMOL/L (ref 3.6–5.5)
SODIUM SERPL-SCNC: 139 MMOL/L (ref 135–145)

## 2018-12-04 NOTE — OR NURSING
"Preadmit appointment: \" Preparing for your Procedure information\" sheet given to patient with verbal and written instructions. Patient instructed to continue prescribed medications through the day before surgery, instructed to take the following medications the day of surgery per anesthesia protocol: None.Pt encourage to discuss with her PCP her GI intolerance with Metformin and educated on importance of controlling elevated blood sugars.   Dr. Carvajal notified of the following info: .  BMI 52.4. Pt recently diagnosed with sleep apnea and has apt in Jan 2019 for mask fitting.  Pt has diabetes but does not take her prescribed Metformin due to GI intolerance,  pt states her MD is not aware she is not taking this medication and patient does not check her glucose.  HbA1c, BMP drawn today.  Pt’s PCP is at the [facility], which she said she had a visit about a month ago.      Very high risk patient with potential for perioperative morbidity.  I’ll ask Dr Barnes if this is elective or not as I’d prefer this patient to be delayed until her JONAH is treated and she sustains compliance with her medication regimen. Thank you.     Shabnam Carvajal M.D.  [facility]"

## 2018-12-05 ENCOUNTER — HOSPITAL ENCOUNTER (OUTPATIENT)
Facility: MEDICAL CENTER | Age: 38
End: 2018-12-05
Attending: ORTHOPAEDIC SURGERY | Admitting: ORTHOPAEDIC SURGERY
Payer: COMMERCIAL

## 2018-12-05 VITALS
RESPIRATION RATE: 20 BRPM | TEMPERATURE: 98.1 F | HEIGHT: 67 IN | OXYGEN SATURATION: 95 % | HEART RATE: 89 BPM | WEIGHT: 293 LBS | DIASTOLIC BLOOD PRESSURE: 66 MMHG | BODY MASS INDEX: 45.99 KG/M2 | SYSTOLIC BLOOD PRESSURE: 149 MMHG

## 2018-12-05 LAB
B-HCG FREE SERPL-ACNC: <5 MIU/ML
IHCGL IHCGL: NEGATIVE MIU/ML

## 2018-12-05 PROCEDURE — 84702 CHORIONIC GONADOTROPIN TEST: CPT

## 2018-12-05 RX ORDER — SODIUM CHLORIDE, SODIUM LACTATE, POTASSIUM CHLORIDE, CALCIUM CHLORIDE 600; 310; 30; 20 MG/100ML; MG/100ML; MG/100ML; MG/100ML
1000 INJECTION, SOLUTION INTRAVENOUS
Status: COMPLETED | OUTPATIENT
Start: 2018-12-05 | End: 2018-12-05

## 2018-12-05 RX ADMIN — SODIUM CHLORIDE, SODIUM LACTATE, POTASSIUM CHLORIDE, CALCIUM CHLORIDE 1000 ML: 600; 310; 30; 20 INJECTION, SOLUTION INTRAVENOUS at 10:07

## 2018-12-05 ASSESSMENT — PAIN SCALES - GENERAL: PAINLEVEL_OUTOF10: 4

## 2018-12-05 NOTE — OR NURSING
0954Arrived pre op, blood sugar obtained finger stick 107informed anesthesia of pt note in her chart regarding keysha and non compliance,   1030anesthesia called out , pt to be rescheduled until keysha issue under control and that he and dr ford will be out to see pt. Informed him also of a recent cut from a knife to rt palm/thumb area, no drainage or redness at this time.   1052 cancelled to home.

## 2019-01-01 ENCOUNTER — APPOINTMENT (OUTPATIENT)
Dept: RADIOLOGY | Facility: IMAGING CENTER | Age: 39
End: 2019-01-01
Attending: PHYSICIAN ASSISTANT
Payer: COMMERCIAL

## 2019-01-01 ENCOUNTER — OFFICE VISIT (OUTPATIENT)
Dept: URGENT CARE | Facility: PHYSICIAN GROUP | Age: 39
End: 2019-01-01
Payer: COMMERCIAL

## 2019-01-01 VITALS
TEMPERATURE: 98 F | DIASTOLIC BLOOD PRESSURE: 78 MMHG | RESPIRATION RATE: 18 BRPM | OXYGEN SATURATION: 96 % | SYSTOLIC BLOOD PRESSURE: 126 MMHG | HEART RATE: 85 BPM

## 2019-01-01 DIAGNOSIS — S82.832A CLOSED FRACTURE OF DISTAL END OF LEFT FIBULA, UNSPECIFIED FRACTURE MORPHOLOGY, INITIAL ENCOUNTER: ICD-10-CM

## 2019-01-01 DIAGNOSIS — S93.402A SPRAIN OF LEFT ANKLE, UNSPECIFIED LIGAMENT, INITIAL ENCOUNTER: ICD-10-CM

## 2019-01-01 PROCEDURE — 73610 X-RAY EXAM OF ANKLE: CPT | Mod: 26,LT | Performed by: PHYSICIAN ASSISTANT

## 2019-01-01 PROCEDURE — 99214 OFFICE O/P EST MOD 30 MIN: CPT | Performed by: PHYSICIAN ASSISTANT

## 2019-01-01 ASSESSMENT — PAIN SCALES - GENERAL: PAINLEVEL: 10=SEVERE PAIN

## 2019-01-01 NOTE — LETTER
January 1, 2019         Patient: Froy Acosta   YOB: 1980   Date of Visit: 1/1/2019           To Whom it May Concern:    Froy Acosta was seen in my clinic on 1/1/2019. She may return to work on 1/7/19. She has been diagnosed with a fibular fracture and is being referred to orthopedics who will determine future restrictions. Until then, no standing/walking for more than 1 hour per shift.    If you have any questions or concerns, please don't hesitate to call.        Sincerely,           Neeru Patel P.A.-C.  Electronically Signed

## 2019-01-01 NOTE — PROGRESS NOTES
Chief Complaint   Patient presents with   • Ankle Injury     left ankle after rolling on a hill this AM        HISTORY OF PRESENT ILLNESS: Patient is a 38 y.o. female who presents today for the following:    Comes in for evaluation of left ankle pain.  She stepped wrong today, causing her left foot to go underneath her left leg.  She has had pain on the lateral aspect of her left ankle since then.  She has been walking around on it.  She takes meloxicam for chronic pain and smokes marijuana as well.  She states she has a medical marijuana card.  She has planned surgery on the right wrist later this month.    Patient Active Problem List    Diagnosis Date Noted   • Severe sleep apnea 11/02/2018   • Morbid obesity with BMI of 45.0-49.9, adult (Formerly [facility]) 08/03/2018   • Bell's palsy 07/30/2014   • Numbness 07/30/2014   • Ear pain 07/30/2014       Allergies:Tape    Current Outpatient Prescriptions Ordered in [facility]   Medication Sig Dispense Refill   • meloxicam (MOBIC) 15 MG tablet meloxicam 15 mg daily     • sumatriptan (IMITREX) 100 MG tablet Take 1/2-1 tablet po at onset of headache, may repeat dose in 2 hours if unrelieved.  Do not exceed more than 2 tablets in 24 hours. 9 Tab 5     No current Epic-ordered facility-administered medications on file.        Past Medical History:   Diagnosis Date   • Back pain     lower back   • Degenerative disc disease    • Elevated cholesterol     history of per patient   • Obesity    • Sleep apnea     10/2018 has plans to be fitted for mask in Jan 2019   • Snoring    • Type II or unspecified type diabetes mellitus without mention of complication, not stated as uncontrolled     diet, oral agents       Social History   Substance Use Topics   • Smoking status: Current Some Day Smoker     Types: Cigars, Cigarettes   • Smokeless tobacco: Never Used      Comment: smokes socially, one pack every two month; cigars once a month   • Alcohol use Yes      Comment: 4 drinks/weekend       Family Status    Relation Status   • Mo Alive   • Fa    • Neg Hx (Not Specified)     Family History   Problem Relation Age of Onset   • Other Mother         lupus   • Other Father         accident   • Sleep Apnea Neg Hx        Review of Systems:    Constitutional ROS: No unexpected change in weight, No weakness, No fatigue  Eye ROS: No recent significant change in vision, No eye pain, redness, discharge  Ear ROS: No drainage, No tinnitus or vertigo, No recent change in hearing  Mouth/Throat ROS: No teeth or gum problems, No bleeding gums, No tongue complaints  Neck ROS: No swollen glands, No significant pain in neck  Pulmonary ROS: No chronic cough, sputum, or hemoptysis, No dyspnea on exertion, No wheezing  Cardiovascular ROS: No diaphoresis, No edema, No palpitations  Gastrointestinal ROS: No change in bowel habits, No significant change in appetite, No nausea, vomiting, diarrhea, or constipation    Exam:  Blood pressure 126/78, pulse 85, temperature 36.7 °C (98 °F), temperature source Temporal, resp. rate 18, SpO2 96 %, not currently breastfeeding.  General: Well developed, well nourished.  Moderate distress due to pain.  HEENT: Head is grossly normal.  Pulmonary: Unlabored respiratory effort.    Cardiovascular: Brisk capillary refill in the left foot.  Extremities: Soft tissue swelling, erythema, and tenderness noted on the lateral aspect of left ankle.  Neurologic: Grossly nonfocal. No facial asymmetry noted.  Skin: Warm, dry, good turgor. No rashes in visible areas.   Psych: Normal mood. Alert and oriented x3. Judgment and insight is normal.    Left ankle x-ray, per my read:  Distal fibular fracture    Assessment/Plan:  Walking boot provided to the patient.  Referring to orthopedics.  Discussed appropriate over-the-counter symptom medic medication for pain.  1. Closed fracture of distal end of left fibula, unspecified fracture morphology, initial encounter  REFERRAL TO ORTHOPEDICS

## 2019-01-03 ENCOUNTER — SLEEP STUDY (OUTPATIENT)
Dept: SLEEP MEDICINE | Facility: MEDICAL CENTER | Age: 39
End: 2019-01-03
Attending: FAMILY MEDICINE
Payer: COMMERCIAL

## 2019-01-03 DIAGNOSIS — G47.30 SEVERE SLEEP APNEA: ICD-10-CM

## 2019-01-03 PROCEDURE — 95811 POLYSOM 6/>YRS CPAP 4/> PARM: CPT | Performed by: FAMILY MEDICINE

## 2019-01-04 ENCOUNTER — SLEEP CENTER VISIT (OUTPATIENT)
Dept: SLEEP MEDICINE | Facility: MEDICAL CENTER | Age: 39
End: 2019-01-04
Payer: COMMERCIAL

## 2019-01-04 VITALS
HEIGHT: 67 IN | SYSTOLIC BLOOD PRESSURE: 118 MMHG | HEART RATE: 104 BPM | DIASTOLIC BLOOD PRESSURE: 70 MMHG | OXYGEN SATURATION: 94 % | RESPIRATION RATE: 15 BRPM | BODY MASS INDEX: 45.99 KG/M2 | WEIGHT: 293 LBS

## 2019-01-04 DIAGNOSIS — G47.31 COMPLEX SLEEP APNEA SYNDROME: ICD-10-CM

## 2019-01-04 PROBLEM — G47.39 COMPLEX SLEEP APNEA SYNDROME: Status: ACTIVE | Noted: 2018-11-02

## 2019-01-04 PROCEDURE — 99213 OFFICE O/P EST LOW 20 MIN: CPT | Performed by: FAMILY MEDICINE

## 2019-01-04 NOTE — PROGRESS NOTES
[facility] Follow Up Note     Date: 1/4/2019 / Time: 9:36 AM    Patient ID:   Name:             Froy Acosta   YOB: 1980  Age:                 38 y.o.  female   MRN:               [id number]      Thank you for requesting a sleep medicine consultation on Froy Acosta at the sleep center. She presents today with the chief complaints of SS and complex sleep apnea follow up.     HISTORY OF PRESENT ILLNESS:       Pt is currently not on PAP therapy. No change in sleep or medical hx  She goes to sleep around 10:30 pm and wakes up around 6 am. She is getting about 6 hrs of sleep on a good night and about 4 hr of sleep on a bad night. The bad nights are about 3-5 per week.  The symptoms of excessive daytime, snoring and gasping and JONAH  has continued.     Since her last visit she had BiPAP and ASV titration. It was started at BiPAP 12/9 and the ending pressure was ASV EPAP 4/15 cm PS 2/15 cm, the AHI was 6 and O2 nathaly 90. Pt was not observed in supine or REM sleep.     She had a ground level fall on new years day and had a spiral fibular fracture. Currently non weight baring.     SLEEP HISTORY   Severe obstructive sleep apnea syndrome, AHI: 89/h, associated with desaturations to 77% SPO2.  CPAP therapy was started at 6 cm of water and titrated to 14 cm of water.  Due to central apneas, she was switched to bilevel therapy 12/8 cm of water      REVIEW OF SYSTEMS:       Constitutional: Denies fevers, Denies weight changes  Eyes: Denies changes in vision, no eye pain  Ears/Nose/Throat/Mouth: Denies nasal congestion or sore throat   Cardiovascular: Denies chest pain or palpitations   Respiratory: Denies shortness of breath , Denies cough  Gastrointestinal/Hepatic: Denies abdominal pain, nausea, vomiting, diarrhea, constipation or GI bleeding   Genitourinary: Denies bladder dysfunction, dysuria or frequency  Musculoskeletal/Rheum: Denies  joint pain and swelling   Skin/Breast: Denies  "rash,   Neurological: Denies headache, confusion, memory loss or focal weakness/parasthesias  Psychiatric: denies mood disorder     Comprehensive review of systems form is reviewed with the patient and is attached in the EMR.     PMH:  has a past medical history of Back pain; Degenerative disc disease; Elevated cholesterol; Obesity; Sleep apnea; Snoring; and Type II or unspecified type diabetes mellitus without mention of complication, not stated as uncontrolled.  MEDS:   Current Outpatient Prescriptions:   •  metformin (GLUCOPHAGE) 1000 MG tablet, metformin 1,000 mg tablet, Disp: , Rfl:   •  meloxicam (MOBIC) 15 MG tablet, meloxicam 15 mg daily, Disp: , Rfl:   •  sumatriptan (IMITREX) 100 MG tablet, Take 1/2-1 tablet po at onset of headache, may repeat dose in 2 hours if unrelieved.  Do not exceed more than 2 tablets in 24 hours., Disp: 9 Tab, Rfl: 5  ALLERGIES:   Allergies   Allergen Reactions   • Tape Rash     blisters     SURGHX:   Past Surgical History:   Procedure Laterality Date   • YONATHAN BY LAPAROSCOPY  12/2001     SOCHX:  reports that she has quit smoking. Her smoking use included Cigars and Cigarettes. She has never used smokeless tobacco. She reports that she drinks alcohol. She reports that she does not use drugs..  FH:   Family History   Problem Relation Age of Onset   • Other Mother         lupus   • Other Father         accident   • Sleep Apnea Neg Hx          Physical Exam:  Vitals/ General Appearance:   Weight/BMI: Body mass index is 52.47 kg/m².  Blood pressure 118/70, pulse (!) 104, resp. rate 15, height 1.702 m (5' 7\"), weight (!) 152 kg (335 lb), SpO2 94 %, not currently breastfeeding.  Vitals:    01/04/19 0926   BP: 118/70   BP Location: Right arm   Patient Position: Sitting   BP Cuff Size: Adult   Pulse: (!) 104   Resp: 15   SpO2: 94%   Weight: (!) 152 kg (335 lb)   Height: 1.702 m (5' 7\")       Pt. is alert and oriented to time, place and person. Cooperative and in no apparent distress. "       1. Head: Atraumatic, normocephalic.   2. Ears: Normal tympanic membrane and no discharge  3. Nose: + inferior turbinate hypertophy  4. Throat: Oropharynx appears crowded in that the palate is overhanging   5. Neck: Supple. No thyromegaly  6. Chest: Trachea central, no spine deformity   7. Lungs auscultation: B/L good air entry, vesicular breath sounds, no adventitious sounds  8. Heart auscultation: 1st and 2nd heart sounds normal, regular rhythm. No appreciable murmur.  9.  Extremities: Left leg in non weight baring foot   10. Skin: No rash      INVESTIGATIONS:           ASSESSMENT AND PLAN     1. Sleep Apnea (JONAH).The symptoms of JONAH continues.      The pathophysiology of sleep anea and the increased risk of cardiovascular morbidity from untreated sleep apnea is discussed in detail with the patient.  She is urged to avoid supine sleep, weight gain and alcoholic beverages since all of these can worsen sleep apnea. She is cautioned against drowsy driving. If She feels sleepy while driving, She must pull over for a break/nap, rather than persist on the road, in the interest of She own safety and that of others on the road.   Plan   - After informed discussion ASV EPAP 4/15 cm PS 2/15 cm  With simplus FFM is ordered today    - F/u in 8-10 weeks to assess the efficiacy of recommended pressure    - SS was reviewed and discussed with the pt   - compliance was reinforced     2.Regarding treatment of other past medical problems and general health maintenance,  She is urged to follow up with PCP.

## 2019-01-04 NOTE — PROCEDURES
Technical summary: The patient underwent a CPAP titration.  This was a 16 channel montage study to include a 6 channel EEG, a 2 channel EOG, and chin EMG, left and right leg EMG, a snore channel, and a CFLOW pressure transducer.   Respiratory effort was assessed with the use of a thoracic and abdominal monitor and overnight oximetry was obtained. Audio and video recordings were reviewed. This was a fully attended study and sleep stage scoring was performed. The test was technically adequate.    General sleep summary:  During the overnight study, the sleep latency was 15 min which is normal. The REM latency was 90 min which is normal. The total sleep time was 390 min and sleep efficiency was 68 % which is decreased.  Sleep stage proportions showed normal sleep except increased WASO.  In regards to sleep quality there was a mild degree of sleep fragmentation as shown by the arousal index of 6 an hour. The arousals were due to spontaneous arousal.    CPAP Titration:  The PAP titration was initiated with BiPAP 13/9 cm of water and the pressure which was slowly titrated up in an attempt to eliminate sleep disordered breathing and snoring. BiPAP was increased to 21/17  Cm. Due to complex sleep apnea, ASV was tired as well at EPAP 4/15 cm PS 2/15 cm. At this final pressure the patient was not observed supine position REM sleep stage.The apnea hypopnea index improved to 6 per hour and O2 nathaly 90%. The average O2 stauration was 93%. He spent 48 min of sleep time below 89% O2 saturation. Snoring was resolved. There were no significant periodic limb movements.  The patient demonstrated NSR and an average heart rate of 75 beats per minute.  There was no ventricular ectopy or tachyarrhythmias. The patient utilized large simplus mask with heated humidification. The CPAP was well-tolerated and there were minimal air leaks. No supplemental oxygen was required.    Impression:  1.  Complex sleep apnea   2.  Mild residual  hypoxia      Recommendations:  I recommend ASV EPAP 4/15 cm PS 2/15 cm with simplus mask. Consider supplemental O2 bleed in and f/u with OPO on the recommended pressure due to residual sleep hypoxia. Recommended 30 day compliance download to assess the efficacy of the recommended pressure and compliance for further outpatient monitoring and management of CPAP therapy. In some cases alternative treatment options may prove effective in resolving sleep apnea and these options include upper airway surgery, the use of a dental orthotic or weight loss and positional therapy. Clinical correlation is required. In general patients with sleep apnea are advised to avoid alcohol and sedatives and to not operate a motor vehicle while drowsy and are at a greater risk for cardiovascular disease.

## 2019-01-09 DIAGNOSIS — Z01.812 PRE-OPERATIVE LABORATORY EXAMINATION: ICD-10-CM

## 2019-01-09 LAB
ANION GAP SERPL CALC-SCNC: 10 MMOL/L (ref 0–11.9)
BUN SERPL-MCNC: 15 MG/DL (ref 8–22)
CALCIUM SERPL-MCNC: 9.3 MG/DL (ref 8.5–10.5)
CHLORIDE SERPL-SCNC: 106 MMOL/L (ref 96–112)
CO2 SERPL-SCNC: 23 MMOL/L (ref 20–33)
CREAT SERPL-MCNC: 0.74 MG/DL (ref 0.5–1.4)
GLUCOSE SERPL-MCNC: 122 MG/DL (ref 65–99)
HCG SERPL QL: NEGATIVE
POTASSIUM SERPL-SCNC: 4.2 MMOL/L (ref 3.6–5.5)
SCCMEC + MECA PNL NOSE NAA+PROBE: NEGATIVE
SCCMEC + MECA PNL NOSE NAA+PROBE: NEGATIVE
SODIUM SERPL-SCNC: 139 MMOL/L (ref 135–145)

## 2019-01-09 PROCEDURE — 87640 STAPH A DNA AMP PROBE: CPT | Mod: XU

## 2019-01-09 PROCEDURE — 83036 HEMOGLOBIN GLYCOSYLATED A1C: CPT

## 2019-01-09 PROCEDURE — 87641 MR-STAPH DNA AMP PROBE: CPT

## 2019-01-09 PROCEDURE — 36415 COLL VENOUS BLD VENIPUNCTURE: CPT

## 2019-01-09 PROCEDURE — 84703 CHORIONIC GONADOTROPIN ASSAY: CPT

## 2019-01-09 PROCEDURE — 80048 BASIC METABOLIC PNL TOTAL CA: CPT

## 2019-01-09 NOTE — OR NURSING
Telephone preadmit assessment done by phone. Pt will come in at approx 1330 today for lab and ekg testing. Pt is aware of npo status.

## 2019-01-10 ENCOUNTER — APPOINTMENT (OUTPATIENT)
Dept: RADIOLOGY | Facility: MEDICAL CENTER | Age: 39
End: 2019-01-10
Attending: ORTHOPAEDIC SURGERY
Payer: COMMERCIAL

## 2019-01-10 ENCOUNTER — HOSPITAL ENCOUNTER (OUTPATIENT)
Facility: MEDICAL CENTER | Age: 39
End: 2019-01-10
Attending: ORTHOPAEDIC SURGERY | Admitting: ORTHOPAEDIC SURGERY
Payer: COMMERCIAL

## 2019-01-10 VITALS
RESPIRATION RATE: 14 BRPM | SYSTOLIC BLOOD PRESSURE: 146 MMHG | OXYGEN SATURATION: 96 % | BODY MASS INDEX: 45.99 KG/M2 | DIASTOLIC BLOOD PRESSURE: 97 MMHG | HEIGHT: 67 IN | HEART RATE: 82 BPM | TEMPERATURE: 98.2 F | WEIGHT: 293 LBS

## 2019-01-10 LAB
EST. AVERAGE GLUCOSE BLD GHB EST-MCNC: 140 MG/DL
GLUCOSE BLD-MCNC: 103 MG/DL (ref 65–99)
HBA1C MFR BLD: 6.5 % (ref 0–5.6)

## 2019-01-10 PROCEDURE — 160009 HCHG ANES TIME/MIN: Performed by: ORTHOPAEDIC SURGERY

## 2019-01-10 PROCEDURE — 700101 HCHG RX REV CODE 250

## 2019-01-10 PROCEDURE — 160022 HCHG BLOCK: Performed by: ORTHOPAEDIC SURGERY

## 2019-01-10 PROCEDURE — 160048 HCHG OR STATISTICAL LEVEL 1-5: Performed by: ORTHOPAEDIC SURGERY

## 2019-01-10 PROCEDURE — 501445 HCHG STAPLER, SKIN DISP: Performed by: ORTHOPAEDIC SURGERY

## 2019-01-10 PROCEDURE — 700111 HCHG RX REV CODE 636 W/ 250 OVERRIDE (IP): Performed by: ANESTHESIOLOGY

## 2019-01-10 PROCEDURE — 160025 RECOVERY II MINUTES (STATS): Performed by: ORTHOPAEDIC SURGERY

## 2019-01-10 PROCEDURE — 160047 HCHG PACU  - EA ADDL 30 MINS PHASE II: Performed by: ORTHOPAEDIC SURGERY

## 2019-01-10 PROCEDURE — 160035 HCHG PACU - 1ST 60 MINS PHASE I: Performed by: ORTHOPAEDIC SURGERY

## 2019-01-10 PROCEDURE — 700111 HCHG RX REV CODE 636 W/ 250 OVERRIDE (IP)

## 2019-01-10 PROCEDURE — 502000 HCHG MISC OR IMPLANTS RC 0278: Performed by: ORTHOPAEDIC SURGERY

## 2019-01-10 PROCEDURE — 700102 HCHG RX REV CODE 250 W/ 637 OVERRIDE(OP): Performed by: ANESTHESIOLOGY

## 2019-01-10 PROCEDURE — 160036 HCHG PACU - EA ADDL 30 MINS PHASE I: Performed by: ORTHOPAEDIC SURGERY

## 2019-01-10 PROCEDURE — 160046 HCHG PACU - 1ST 60 MINS PHASE II: Performed by: ORTHOPAEDIC SURGERY

## 2019-01-10 PROCEDURE — 160041 HCHG SURGERY MINUTES - EA ADDL 1 MIN LEVEL 4: Performed by: ORTHOPAEDIC SURGERY

## 2019-01-10 PROCEDURE — C1713 ANCHOR/SCREW BN/BN,TIS/BN: HCPCS | Performed by: ORTHOPAEDIC SURGERY

## 2019-01-10 PROCEDURE — 160002 HCHG RECOVERY MINUTES (STAT): Performed by: ORTHOPAEDIC SURGERY

## 2019-01-10 PROCEDURE — A9270 NON-COVERED ITEM OR SERVICE: HCPCS | Performed by: ANESTHESIOLOGY

## 2019-01-10 PROCEDURE — 160029 HCHG SURGERY MINUTES - 1ST 30 MINS LEVEL 4: Performed by: ORTHOPAEDIC SURGERY

## 2019-01-10 PROCEDURE — 82962 GLUCOSE BLOOD TEST: CPT

## 2019-01-10 PROCEDURE — 500881 HCHG PACK, EXTREMITY: Performed by: ORTHOPAEDIC SURGERY

## 2019-01-10 PROCEDURE — 501838 HCHG SUTURE GENERAL: Performed by: ORTHOPAEDIC SURGERY

## 2019-01-10 DEVICE — IMPLANTABLE DEVICE: Type: IMPLANTABLE DEVICE | Site: ANKLE | Status: FUNCTIONAL

## 2019-01-10 RX ORDER — SODIUM CHLORIDE, SODIUM LACTATE, POTASSIUM CHLORIDE, CALCIUM CHLORIDE 600; 310; 30; 20 MG/100ML; MG/100ML; MG/100ML; MG/100ML
INJECTION, SOLUTION INTRAVENOUS CONTINUOUS
Status: DISCONTINUED | OUTPATIENT
Start: 2019-01-10 | End: 2019-01-10 | Stop reason: HOSPADM

## 2019-01-10 RX ORDER — HALOPERIDOL 5 MG/ML
1 INJECTION INTRAMUSCULAR
Status: DISCONTINUED | OUTPATIENT
Start: 2019-01-10 | End: 2019-01-10 | Stop reason: HOSPADM

## 2019-01-10 RX ORDER — OXYCODONE HCL 5 MG/5 ML
10 SOLUTION, ORAL ORAL
Status: COMPLETED | OUTPATIENT
Start: 2019-01-10 | End: 2019-01-10

## 2019-01-10 RX ORDER — ONDANSETRON 2 MG/ML
4 INJECTION INTRAMUSCULAR; INTRAVENOUS
Status: DISCONTINUED | OUTPATIENT
Start: 2019-01-10 | End: 2019-01-10 | Stop reason: HOSPADM

## 2019-01-10 RX ORDER — OXYCODONE HCL 5 MG/5 ML
5 SOLUTION, ORAL ORAL
Status: COMPLETED | OUTPATIENT
Start: 2019-01-10 | End: 2019-01-10

## 2019-01-10 RX ORDER — HYDRALAZINE HYDROCHLORIDE 20 MG/ML
5 INJECTION INTRAMUSCULAR; INTRAVENOUS
Status: DISCONTINUED | OUTPATIENT
Start: 2019-01-10 | End: 2019-01-10 | Stop reason: HOSPADM

## 2019-01-10 RX ORDER — SODIUM CHLORIDE, SODIUM LACTATE, POTASSIUM CHLORIDE, CALCIUM CHLORIDE 600; 310; 30; 20 MG/100ML; MG/100ML; MG/100ML; MG/100ML
1000 INJECTION, SOLUTION INTRAVENOUS
Status: DISCONTINUED | OUTPATIENT
Start: 2019-01-10 | End: 2019-01-10 | Stop reason: HOSPADM

## 2019-01-10 RX ORDER — CEFAZOLIN SODIUM 1 G/3ML
INJECTION, POWDER, FOR SOLUTION INTRAMUSCULAR; INTRAVENOUS
Status: DISCONTINUED | OUTPATIENT
Start: 2019-01-10 | End: 2019-01-10 | Stop reason: HOSPADM

## 2019-01-10 RX ORDER — MEPERIDINE HYDROCHLORIDE 25 MG/ML
12.5 INJECTION INTRAMUSCULAR; INTRAVENOUS; SUBCUTANEOUS
Status: DISCONTINUED | OUTPATIENT
Start: 2019-01-10 | End: 2019-01-10 | Stop reason: HOSPADM

## 2019-01-10 RX ADMIN — FENTANYL CITRATE 50 MCG: 50 INJECTION, SOLUTION INTRAMUSCULAR; INTRAVENOUS at 12:11

## 2019-01-10 RX ADMIN — FENTANYL CITRATE 25 MCG: 50 INJECTION, SOLUTION INTRAMUSCULAR; INTRAVENOUS at 19:35

## 2019-01-10 RX ADMIN — SODIUM CHLORIDE, SODIUM LACTATE, POTASSIUM CHLORIDE, CALCIUM CHLORIDE 1000 ML: 600; 310; 30; 20 INJECTION, SOLUTION INTRAVENOUS at 08:54

## 2019-01-10 RX ADMIN — FENTANYL CITRATE 25 MCG: 50 INJECTION, SOLUTION INTRAMUSCULAR; INTRAVENOUS at 19:28

## 2019-01-10 RX ADMIN — FENTANYL CITRATE 50 MCG: 50 INJECTION, SOLUTION INTRAMUSCULAR; INTRAVENOUS at 12:05

## 2019-01-10 RX ADMIN — OXYCODONE HYDROCHLORIDE 10 MG: 5 SOLUTION ORAL at 12:02

## 2019-01-10 RX ADMIN — FENTANYL CITRATE 25 MCG: 50 INJECTION, SOLUTION INTRAMUSCULAR; INTRAVENOUS at 16:50

## 2019-01-10 RX ADMIN — FENTANYL CITRATE 25 MCG: 50 INJECTION, SOLUTION INTRAMUSCULAR; INTRAVENOUS at 17:00

## 2019-01-10 ASSESSMENT — PAIN SCALES - GENERAL
PAINLEVEL_OUTOF10: 4
PAINLEVEL_OUTOF10: ASSUMED PAIN PRESENT
PAINLEVEL_OUTOF10: 4
PAINLEVEL_OUTOF10: ASSUMED PAIN PRESENT
PAINLEVEL_OUTOF10: 3
PAINLEVEL_OUTOF10: 3
PAINLEVEL_OUTOF10: 8

## 2019-01-10 NOTE — DISCHARGE INSTRUCTIONS
Medial or Posterior Malleolus Fracture Treated With ORIF  Introduction  A fracture of the malleolus is a break (fracture) of the tibia, which is the large bone in your lower leg. The medial malleolus is the lower part of the tibia that you feel as the bump on the inside of your ankle. The posterior malleolus is the lower-rear part of the tibia that is closest to your heel.  If the fracture is displaced, that means that the bones are not lined up correctly and the ankle joint may be unstable. The bones will be put back into position with a procedure that is called open reduction with internal fixation (ORIF). A combination of screws, screws and a metal plate, or different types of wiring are used to hold the bones in place.  Tell a health care provider about:  · Any allergies you have.  · All medicines you are taking, including vitamins, herbs, eye drops, creams, and over-the-counter medicines.  · Any problems you or family members have had with anesthetic medicines.  · Any blood disorders you have.  · Any surgeries you have had.  · Any medical conditions you have.  What are the risks?  Generally, this is a safe procedure. However, problems may occur, including:  · Excessive bleeding.  · Infection.  · Failure of the fracture to heal.  · Long-term pain and stiffness (arthritis).  · Stiffness of the ankle after the repair.  What happens before the procedure?  · Ask your health care provider about:  ¨ Changing or stopping your regular medicines. This is especially important if you are taking diabetes medicines or blood thinners.  ¨ Taking medicines such as aspirin and ibuprofen. These medicines can thin your blood. Do not take these medicines before your procedure if your health care provider instructs you not to.  · Follow instructions from your health care provider about eating or drinking restrictions.  · Plan to have someone take you home after the procedure.  · If you go home right after the procedure, plan to have  someone with you for 24 hours.  What happens during the procedure?  · An IV tube will be inserted into one of your veins.  · You may be given an antibiotic medicine.  · You will be given one or more of the following:  ¨ A medicine that helps you relax (sedative).  ¨ A medicine that makes you fall asleep (general anesthetic).  ¨ A medicine that is injected into your spine that numbs the area below and slightly above the injection site (spinal anesthetic).  · The skin over your ankle will be cleaned with a germ-killing (antiseptic) solution.  · The surgeon will make an incision through your skin to expose the areas of the fracture.  · The broken bones will be put back into their normal positions. The surgeon will use a combination of screws, screws and a metal plate, or different types of wiring to hold the bones in place.  · After the bones are back in place, the surgeon will close the incision using stitches (sutures) or staples.  · A bandage (dressing) and a cast or supportive boot will be placed over your ankle.  What happens after the procedure?  · Your blood pressure, heart rate, breathing rate, and blood oxygen level will be monitored often until the medicines you were given have worn off.  · You may be given medicine to control the pain.  · You will be helped out of bed so you can begin moving around. It is important for you to start moving around several times a day.  This information is not intended to replace advice given to you by your health care provider. Make sure you discuss any questions you have with your health care provider.  Document Released: 09/09/2003 Document Revised: 05/25/2017 Document Reviewed: 11/11/2015  © 2017 [employer]    ACTIVITY: Rest and take it easy for the first 24 hours.  A responsible adult is recommended to remain with you during that time.  It is normal to feel sleepy.  We encourage you to not do anything that requires balance, judgment or coordination.    MILD FLU-LIKE SYMPTOMS  ARE NORMAL. YOU MAY EXPERIENCE GENERALIZED MUSCLE ACHES, THROAT IRRITATION, HEADACHE AND/OR SOME NAUSEA.    FOR 24 HOURS DO NOT:  Drive, operate machinery or run household appliances.  Drink beer or alcoholic beverages.   Make important decisions or sign legal documents.    SPECIAL INSTRUCTIONS: Elevate leg. Activity is to be non weight bearing on operative extremity.    DIET: To avoid nausea, slowly advance diet as tolerated, avoiding spicy or greasy foods for the first day.  Add more substantial food to your diet according to your physician's instructions.  INCREASE FLUIDS AND FIBER TO AVOID CONSTIPATION.    SURGICAL DRESSING/BATHING: Keep dressings clean and dry. Shower with your leg covered until otherwise notified by your surgeon.    FOLLOW-UP APPOINTMENT:  A follow-up appointment should be arranged with your doctor; call to schedule.    You should CALL YOUR PHYSICIAN if you develop:  Fever greater than 101 degrees F.  Pain not relieved by medication, or persistent nausea or vomiting.  Excessive bleeding (blood soaking through dressing) or unexpected drainage from the wound.  Extreme redness or swelling around the incision site, drainage of pus or foul smelling drainage.  Inability to urinate or empty your bladder within 8 hours.    You should call 911 if you develop problems with breathing or chest pain.    If you are unable to contact your doctor or surgical center, you should go to the nearest emergency room or urgent care center.      Physician's telephone #: Dr. Ngo [phone]    If any questions arise, call your doctor.  If your doctor is not available, please feel free to call the Surgical Center at [phone].  The Center is open Monday through Friday from 7AM to 7PM.      You can also call the [employer] HOTLINE open 24 hours/day, 7 days/week and speak to a nurse at [phone], or toll free at [phone].    A registered nurse may call you a few days after your surgery to see how you are  doing after your procedure.    MEDICATIONS: Resume taking daily medication.  Take prescribed pain medication with food.  If no medication is prescribed, you may take non-aspirin pain medication if needed.  PAIN MEDICATION CAN BE VERY CONSTIPATING.  Take a stool softener or laxative such as senokot, pericolace, or milk of magnesia if needed.    Prescriptions at [facility].      Last pain medication (Oxycodone 10mg) given at 12:00 pm.    If your physician has prescribed pain medication that includes Acetaminophen (Tylenol), do not take additional Acetaminophen (Tylenol) while taking the prescribed medication.        Peripheral Nerve Block Discharge Instructions from Same Day Surgery and Inpatient :    What to Expect - Lower Extremity  · The block may cause you to experience numbness and weakness in your calf and foot on the same side as your surgery  · Numbness, tingling and / or weakness are all normal. For some people, this may be an unpleasant sensation  · These issues will be resolved when the local anesthetic wears off   · You may experience numbness and tingling in your thigh on the same side as your surgery if the block medicine was injected at your groin area  · Numbness will make it difficult to walk  · You may have problems with balance and walking so be very careful   · Follow your surgeon's direction regarding weight bearing on your surgical limb  · Be very careful with your numb limb  Precautions  · The numbness may affect your balance  · Be careful when walking or moving around  · Your leg may be weak: be very careful putting weight on it  · If your surgeon did not specify a time, you should not bear weight for 24 hours  · Be sure to ask for help when you need it  · It is better to have help than to fall and hurt yourself  Prevent Injury  · Protect the limb like a baby  · Beware of exposing your limb to extreme heat or cold or trauma  · The limb may be injured without you noticing because it  "is numb  · Keep the limb elevated whenever possible  · Do not sleep on the limb  · Change the position of the limb regularly  · Avoid putting pressure on your surgical limb  Pain Control  · The initial block on the day of surgery will make your extremity feel \"numb\"  · Any consecutive injection including prior to discharge from the hospital will make your extremity feel \"numb\"  · You may feel an aching or burning when the local anesthesia starts to wear off  · Take pain pills as prescribed by your surgeon  · Call your surgeon or anesthesiologist if you do not have adequate pain control      Depression / Suicide Risk    As you are discharged from this [facility], it is important to learn how to keep safe from harming yourself.    Recognize the warning signs:  · Abrupt changes in personality, positive or negative- including increase in energy   · Giving away possessions  · Change in eating patterns- significant weight changes-  positive or negative  · Change in sleeping patterns- unable to sleep or sleeping all the time   · Unwillingness or inability to communicate  · Depression  · Unusual sadness, discouragement and loneliness  · Talk of wanting to die  · Neglect of personal appearance   · Rebelliousness- reckless behavior  · Withdrawal from people/activities they love  · Confusion- inability to concentrate     If you or a loved one observes any of these behaviors or has concerns about self-harm, here's what you can do:  · Talk about it- your feelings and reasons for harming yourself  · Remove any means that you might use to hurt yourself (examples: pills, rope, extension cords, firearm)  · Get professional help from the community (Mental Health, Substance Abuse, psychological counseling)  · Do not be alone:Call your Safe Contact- someone whom you trust who will be there for you.  · Call your local CRISIS HOTLINE 069-7703 or 046-152-3904  · Call your local Children's Mobile Crisis Response Team [place] (793) 879-5622 or www.[employer]  · Call the toll free National Suicide Prevention Hotlines   · National Suicide Prevention Lifeline 291-202-BBXR (6744)  National [employer] Line Network 800-SUICIDE (969-9371)    ·

## 2019-01-10 NOTE — OP REPORT
DATE OF SERVICE:  01/10/2019    SURGEON:  Mario Ngo MD    ANESTHESIOLOGIST:  Ian Rodriguez MD    PREOPERATIVE DIAGNOSES:  1.  Displaced left lateral malleolus fracture (Pedroza C).  2.  Acute left syndesmosis disruption with widening the medial clear space.    POSTOPERATIVE DIAGNOSES:  1.  Displaced left lateral malleolus fracture (Pedroza C).  2.  Acute left syndesmosis disruption with widening the medial clear space.    OPERATIVE PROCEDURE:  1.  ORIF left lateral malleolus fracture.  2.  Open repair of left syndesmosis disruption.    EQUIPMENT USED:  Smith and Nephew plate and screws plus Invisiknot x2.    DESCRIPTION OF PROCEDURE:  I saw the patient yesterday in office.  Today, she   was worked into the schedule to proceed with ORIF of her lateral ankle   fracture with widened medial clear space, which is already a week-and-a-half   out.  She does have sleep apnea and is awaiting her CPAP machine.  Dr. Rodriguez   is aware of this and will manage discharge considerations as well as home   oxygen as indicated with regard to respiratory status.    I met with the patient preoperatively in the holding area.  I reevaluated and   skin scribed her and discussed the procedure, postoperative medication dosing   as well as the risk of surgery.  Informed consent was obtained.    Dr. Rodriguez placed a sciatic level block for postoperative pain management and   intraoperatively a general anesthetic.  A thigh tourniquet was placed prior to   positioning and leg was exsanguinated and thigh tourniquet inflated due to   the shape of her thigh and the concern I had for the tourniquet to migrate   prior to inflation.  She was then placed essentially in a full lateral   position, axillary roll, upper extremity position by anesthesia.  Her lower   extremities were well protected.    She was prepped and draped and a time-out performed prior to the onset of   procedure, which included confirmation of IV antibiotics within an hour  of   surgical cut time.    Loupe magnification was used throughout and fluoro guidance used as indicated.    Permanent radiographs were obtained at the end of the procedure and   submitted to PACS for storage.    I approached the lateral malleolus fracture first along the posterior half of   the lateral malleolus, which was easily palpated as well as the fracture step   off.  I isolated the fracture and during dissection to care to not injure the   superficial peroneal nerve, which was not visualized.  The fracture was in   butterfly configuration, but the bone quality was good.  I was able to   anatomically reduce the butterfly and I placed 2 interfrag screws, which held   quite nicely.  Although I could have placed a posterolateral plate for the   fracture itself, this would not have accommodated the obvious need for a   syndesmosis repair due to the plate being to posterior.  I chose instead a   straight lateral plate and extend the incision distally in order to   accommodate this.  This was felt to be best of circumstances.  Throughout the   decision making process, her morbid obesity and concerns therein were   considered.    Plate was affixed under fluoro guidance and the fracture was anatomically   reduced at this point.  I fixed the plate proximally and distally and then   reevaluated the medial clear space, which remained wide.  I was able to reduce   the medial clear space and after the plate was more provisionally fixed, I   passed 2 Invisiknots and repaired the syndesmosis, which was visually   disrupted.    The Invisiknots were placed using standard technique and the washers were   noted to be well reduced.    I then retested and stress the deltoid in medial clear space and noted no   widening.    I completed affixing the plate with combination of locking and nonlocking   screws, again with excellent bone quality.  The fracture was anatomically   reduced and the medial clear space was 3 mm and the  procedure with the ankle   in neutral dorsiflexion.    The wound was irrigated and meticulously closed with layered deep Vicryl   followed by a more superficial layer of Vicryl and then running Monocryl.    Skin staples were used.  A dressing was applied followed by application of an   OCL splint with the ankle in neutral dorsiflexion.  Brisk capillary refill was   noted in the toes upon release of the tourniquet.    Patient has been instructed to initiate aspirin for DVT prophylaxis beginning   tomorrow and as well her pain medication regimen.       ____________________________________     MD JAROD Santana / CYNDIE    DD:  01/10/2019 11:40:41  DT:  01/10/2019 12:25:56    D#:  [id number]  Job#:  [id number]

## 2019-01-10 NOTE — OR NURSING
0872 Pt. Allergies and NPO status verified, home medications reconciled. Belongings secured. Pt. Verbalizes understanding of pain scale, expected course of stay and plan of care. Surgical site verified with pt. Pt. And family given home care instructions for discharge planning. IV access established. Sequentials placed on legs.

## 2019-01-10 NOTE — OR SURGEON
Immediate Post OP Note    PreOp Diagnosis:   Displaced left lateral malleolus fracture (Pedroza C)  Acute left syndesmosis disruption    PostOp Diagnosis:   Same    Procedure(s):  ORIF left lateral malleolus fracture  Open repair of left syndesmosis disruption    Surgeon(s):  Wm Arian Ngo M.D.    Anesthesiologist/Type of Anesthesia:  Anesthesiologist: Ian Rodriguez M.D./General    Surgical Staff:  Circulator: Delgado Coleman R.N.  Scrub Person: Shaun Dwyer  Radiology Technologist: Rut Smallwood    Specimens removed if any:  * No specimens in log *    Equipment: S&N plate/screws + InvisiKnot    Dict #: [id number]      1/10/2019 11:33 AM Wm Arian Ngo M.D.

## 2019-01-10 NOTE — OR NURSING
1132: To PACU post ORIF left distal fibula w/ sciatic block. OPA in place. Toes are pink and warm w/ brisk cap refill.  1146: OPA dc'd, breathing is spontaneous and unlabored.  1155: Pt states having a lot pain, see MAR.  1220: Pt states pain is tolerable.  1240: Pain remains tolerable, no nausea. Meets criteria for stage ll.

## 2019-01-10 NOTE — FACE TO FACE
"Face to Face Note  -  Durable Medical Equipment    Ian Rodriguez M.D. - NPI: [id number]  I certify that this patient is under my care and that they had a durable medical equipment(DME)face to face encounter by myself that meets the physician DME face-to-face encounter requirements with this patient on:    Date of encounter:   Patient:                    MRN:                       YOB: 2020  Froy Acosta  [id number]  1980     The encounter with the patient was in whole, or in part, for the following medical condition, which is the primary reason for durable medical equipment:  Other - Obstructive/Central Sleep Apnea    I certify that, based on my findings, the following durable medical equipment is medically necessary:  Oxygen.    HOME O2 Saturation Measurements:(Values must be present for Home Oxygen orders)      Patient with recent sleep study with frequent desaturation, has not yet received BiPap, now post-op from ankle fracture repair and will be taking opiates for pain control   ,     ,         My Clinical findings support the need for the above equipment due to:  Hypoxia    Supporting Symptoms: The patient requires supplemental oxygen, as the following interventions have been tried with limited or no improvement: \"Incentive spirometry    "

## 2019-01-10 NOTE — PROGRESS NOTES
Bariatric front wheel walker was delivered and adjusted for patient. Patient had no questions regarding FWW use, maintenance, or adjustment. Patient signed [facility] [employer] Agreement form. Patient was left in care of RN with call light in place.

## 2019-01-10 NOTE — DISCHARGE PLANNING
Received Choice form at 3572  Agency/Facility Name: [facility]  Referral sent per Choice form @ 4853

## 2019-01-11 NOTE — OR NURSING
1249- Patient to stage II area and assisted with dressing by nursing assistant. Patient assisted to chair.     1330- Orthopedic technician to side as patient given walker for home. Orders for home oxygen therapy in place.     1400- Patient assisted with walking with walker.     1445- Patient assisted to restroom and currently back to chair. Lunch provided per patient request.     1545- Patient resting in chair after assistance to restroom.    1645- Patient resting in chair after assistance to restroom. Home oxygen to be delivered by [facility].    1655- Fentanyl 25 micrograms given for patient complaints of pain to left lower extremity.    1710- Additional Fentanyl dosage given as charted in medication administration record.     1725- Patient resting in bed. Patient states that previously noted pain is much better at this time    1755- Patient resting in chair. [facility] called and oxygen delivery less than 1 hour out per [facility] representative.      1845- Patient with family at chair side. Currently awaiting oxygen delivery for home. Discharge instructions for home discussed with patient and family.     1900- Patient eating in chair. Patient assisted to restroom with nursing staff previously and currently resting.     1928- Patient with complaints of pain treated with Fentanyl as charted in medication administration record.     1940- Patient resting in chair. Family at side. No distress noted at this time. Patient states that pain is tolerable at this time     2050- [facility] has arrived. Patient with discharge instructions on home oxygen use at this time.    2110- Patient discharged to awaiting vehicle via wheelchair.

## 2019-01-11 NOTE — DISCHARGE PLANNING
Contacted by Mik JIMENEZ PACU for this patient needing home oxygen for night time.  Per notes from sleep study pt has pre-existing sleep apnea and anesthesiologist wants a concentrator delivered before pt can discharge.  Printed and obtained all information, spoke with patient and staff for plan, patient signed DME choice form for [facility].  Then walked all information to Paige NGUYỄN on [facility] to confirm.  She then notified [facility] who agreed to deliver concentrator to Recovery room for patient to discharge home with.  Left all completed notes and information with Paige who will then have information scanned to patient's chart.

## 2019-06-03 DIAGNOSIS — Z01.810 PRE-OPERATIVE CARDIOVASCULAR EXAMINATION: ICD-10-CM

## 2019-06-03 DIAGNOSIS — Z01.812 PRE-OPERATIVE LABORATORY EXAMINATION: ICD-10-CM

## 2019-06-03 LAB
ANION GAP SERPL CALC-SCNC: 8 MMOL/L (ref 0–11.9)
BUN SERPL-MCNC: 11 MG/DL (ref 8–22)
CALCIUM SERPL-MCNC: 8.4 MG/DL (ref 8.5–10.5)
CHLORIDE SERPL-SCNC: 103 MMOL/L (ref 96–112)
CO2 SERPL-SCNC: 24 MMOL/L (ref 20–33)
CREAT SERPL-MCNC: 0.68 MG/DL (ref 0.5–1.4)
EKG IMPRESSION: NORMAL
GLUCOSE SERPL-MCNC: 121 MG/DL (ref 65–99)
HCG SERPL QL: NEGATIVE
POTASSIUM SERPL-SCNC: 3.9 MMOL/L (ref 3.6–5.5)
SODIUM SERPL-SCNC: 135 MMOL/L (ref 135–145)

## 2019-06-03 PROCEDURE — 80048 BASIC METABOLIC PNL TOTAL CA: CPT

## 2019-06-03 PROCEDURE — 93005 ELECTROCARDIOGRAM TRACING: CPT

## 2019-06-03 PROCEDURE — 84703 CHORIONIC GONADOTROPIN ASSAY: CPT

## 2019-06-03 PROCEDURE — 36415 COLL VENOUS BLD VENIPUNCTURE: CPT

## 2019-06-03 PROCEDURE — 93010 ELECTROCARDIOGRAM REPORT: CPT | Performed by: INTERNAL MEDICINE

## 2019-06-03 NOTE — OR NURSING
"Dr Carvajal notified of Patients medical history and medications and BMI 48.91. Response from Dr Carvajal- \"no further work up necessary based on information provided\"  "

## 2019-06-03 NOTE — OR NURSING
PreAdmit Appointment: Patient given Preparing for your procedure handout. Patient instructed to continue regularly prescribed medications through day before surgery. Instructed to take the following medications the day of surgery with a sip of water per Anesthesia protocol: None. Instructed pt to bring CPAP DOS.

## 2019-06-05 ENCOUNTER — ANESTHESIA (OUTPATIENT)
Dept: SURGERY | Facility: MEDICAL CENTER | Age: 39
End: 2019-06-05
Payer: COMMERCIAL

## 2019-06-05 ENCOUNTER — ANESTHESIA EVENT (OUTPATIENT)
Dept: SURGERY | Facility: MEDICAL CENTER | Age: 39
End: 2019-06-05
Payer: COMMERCIAL

## 2019-06-05 ENCOUNTER — APPOINTMENT (OUTPATIENT)
Dept: RADIOLOGY | Facility: MEDICAL CENTER | Age: 39
End: 2019-06-05
Attending: ORTHOPAEDIC SURGERY
Payer: COMMERCIAL

## 2019-06-05 ENCOUNTER — HOSPITAL ENCOUNTER (OUTPATIENT)
Facility: MEDICAL CENTER | Age: 39
End: 2019-06-05
Attending: ORTHOPAEDIC SURGERY | Admitting: ORTHOPAEDIC SURGERY
Payer: COMMERCIAL

## 2019-06-05 VITALS
DIASTOLIC BLOOD PRESSURE: 72 MMHG | TEMPERATURE: 96.8 F | SYSTOLIC BLOOD PRESSURE: 110 MMHG | WEIGHT: 293 LBS | OXYGEN SATURATION: 94 % | BODY MASS INDEX: 44.41 KG/M2 | RESPIRATION RATE: 18 BRPM | HEART RATE: 64 BPM | HEIGHT: 68 IN

## 2019-06-05 DIAGNOSIS — S69.81XA TRIANGULAR FIBROCARTILAGE COMPLEX INJURY, RIGHT, INITIAL ENCOUNTER: ICD-10-CM

## 2019-06-05 LAB — GLUCOSE BLD-MCNC: 98 MG/DL (ref 65–99)

## 2019-06-05 PROCEDURE — 501676 HCHG TUBING, SMALL JOINT: Performed by: ORTHOPAEDIC SURGERY

## 2019-06-05 PROCEDURE — 700105 HCHG RX REV CODE 258: Performed by: ORTHOPAEDIC SURGERY

## 2019-06-05 PROCEDURE — 82962 GLUCOSE BLOOD TEST: CPT

## 2019-06-05 PROCEDURE — 160036 HCHG PACU - EA ADDL 30 MINS PHASE I: Performed by: ORTHOPAEDIC SURGERY

## 2019-06-05 PROCEDURE — 700101 HCHG RX REV CODE 250: Performed by: ORTHOPAEDIC SURGERY

## 2019-06-05 PROCEDURE — 160009 HCHG ANES TIME/MIN: Performed by: ORTHOPAEDIC SURGERY

## 2019-06-05 PROCEDURE — A9270 NON-COVERED ITEM OR SERVICE: HCPCS | Performed by: ANESTHESIOLOGY

## 2019-06-05 PROCEDURE — A6454 SELF-ADHER BAND W>=3" <5"/YD: HCPCS | Performed by: ORTHOPAEDIC SURGERY

## 2019-06-05 PROCEDURE — 160046 HCHG PACU - 1ST 60 MINS PHASE II: Performed by: ORTHOPAEDIC SURGERY

## 2019-06-05 PROCEDURE — 700111 HCHG RX REV CODE 636 W/ 250 OVERRIDE (IP): Performed by: ANESTHESIOLOGY

## 2019-06-05 PROCEDURE — 160025 RECOVERY II MINUTES (STATS): Performed by: ORTHOPAEDIC SURGERY

## 2019-06-05 PROCEDURE — 502576 HCHG PACK, HAND: Performed by: ORTHOPAEDIC SURGERY

## 2019-06-05 PROCEDURE — 160041 HCHG SURGERY MINUTES - EA ADDL 1 MIN LEVEL 4: Performed by: ORTHOPAEDIC SURGERY

## 2019-06-05 PROCEDURE — 501512 HCHG SUTURE PASSER, KNOT PUSHER: Performed by: ORTHOPAEDIC SURGERY

## 2019-06-05 PROCEDURE — 302135 SEQUENTIAL COMPRESSION MACHINE: Performed by: ORTHOPAEDIC SURGERY

## 2019-06-05 PROCEDURE — C1713 ANCHOR/SCREW BN/BN,TIS/BN: HCPCS | Performed by: ORTHOPAEDIC SURGERY

## 2019-06-05 PROCEDURE — 700101 HCHG RX REV CODE 250: Performed by: ANESTHESIOLOGY

## 2019-06-05 PROCEDURE — 160035 HCHG PACU - 1ST 60 MINS PHASE I: Performed by: ORTHOPAEDIC SURGERY

## 2019-06-05 PROCEDURE — 160029 HCHG SURGERY MINUTES - 1ST 30 MINS LEVEL 4: Performed by: ORTHOPAEDIC SURGERY

## 2019-06-05 PROCEDURE — 700102 HCHG RX REV CODE 250 W/ 637 OVERRIDE(OP): Performed by: ANESTHESIOLOGY

## 2019-06-05 PROCEDURE — 160002 HCHG RECOVERY MINUTES (STAT): Performed by: ORTHOPAEDIC SURGERY

## 2019-06-05 PROCEDURE — 501838 HCHG SUTURE GENERAL: Performed by: ORTHOPAEDIC SURGERY

## 2019-06-05 PROCEDURE — 160048 HCHG OR STATISTICAL LEVEL 1-5: Performed by: ORTHOPAEDIC SURGERY

## 2019-06-05 DEVICE — IMPLANTABLE DEVICE: Type: IMPLANTABLE DEVICE | Site: WRIST | Status: FUNCTIONAL

## 2019-06-05 RX ORDER — HALOPERIDOL 5 MG/ML
1 INJECTION INTRAMUSCULAR
Status: DISCONTINUED | OUTPATIENT
Start: 2019-06-05 | End: 2019-06-05 | Stop reason: HOSPADM

## 2019-06-05 RX ORDER — DIPHENHYDRAMINE HYDROCHLORIDE 50 MG/ML
12.5 INJECTION INTRAMUSCULAR; INTRAVENOUS
Status: DISCONTINUED | OUTPATIENT
Start: 2019-06-05 | End: 2019-06-05 | Stop reason: HOSPADM

## 2019-06-05 RX ORDER — ONDANSETRON 2 MG/ML
INJECTION INTRAMUSCULAR; INTRAVENOUS PRN
Status: DISCONTINUED | OUTPATIENT
Start: 2019-06-05 | End: 2019-06-05 | Stop reason: SURG

## 2019-06-05 RX ORDER — CELECOXIB 200 MG/1
200 CAPSULE ORAL ONCE
Status: COMPLETED | OUTPATIENT
Start: 2019-06-05 | End: 2019-06-05

## 2019-06-05 RX ORDER — BUPIVACAINE HYDROCHLORIDE AND EPINEPHRINE 5; 5 MG/ML; UG/ML
INJECTION, SOLUTION EPIDURAL; INTRACAUDAL; PERINEURAL PRN
Status: DISCONTINUED | OUTPATIENT
Start: 2019-06-05 | End: 2019-06-05 | Stop reason: SURG

## 2019-06-05 RX ORDER — SENNA AND DOCUSATE SODIUM 50; 8.6 MG/1; MG/1
1 TABLET, FILM COATED ORAL 2 TIMES DAILY
Qty: 30 TAB | Refills: 1 | Status: SHIPPED | OUTPATIENT
Start: 2019-06-05 | End: 2021-01-08

## 2019-06-05 RX ORDER — ACETAMINOPHEN 500 MG
1000 TABLET ORAL ONCE
Status: COMPLETED | OUTPATIENT
Start: 2019-06-05 | End: 2019-06-05

## 2019-06-05 RX ORDER — SODIUM CHLORIDE, SODIUM LACTATE, POTASSIUM CHLORIDE, CALCIUM CHLORIDE 600; 310; 30; 20 MG/100ML; MG/100ML; MG/100ML; MG/100ML
INJECTION, SOLUTION INTRAVENOUS CONTINUOUS
Status: DISCONTINUED | OUTPATIENT
Start: 2019-06-05 | End: 2019-06-05 | Stop reason: HOSPADM

## 2019-06-05 RX ORDER — HYDROCODONE BITARTRATE AND ACETAMINOPHEN 5; 325 MG/1; MG/1
1-2 TABLET ORAL EVERY 4 HOURS PRN
Qty: 47 TAB | Refills: 0 | Status: SHIPPED | OUTPATIENT
Start: 2019-06-05 | End: 2019-06-12

## 2019-06-05 RX ORDER — MEPERIDINE HYDROCHLORIDE 50 MG/ML
6.25 INJECTION INTRAMUSCULAR; INTRAVENOUS; SUBCUTANEOUS
Status: DISCONTINUED | OUTPATIENT
Start: 2019-06-05 | End: 2019-06-05 | Stop reason: HOSPADM

## 2019-06-05 RX ORDER — GABAPENTIN 300 MG/1
300 CAPSULE ORAL ONCE
Status: COMPLETED | OUTPATIENT
Start: 2019-06-05 | End: 2019-06-05

## 2019-06-05 RX ORDER — ONDANSETRON 2 MG/ML
4 INJECTION INTRAMUSCULAR; INTRAVENOUS
Status: DISCONTINUED | OUTPATIENT
Start: 2019-06-05 | End: 2019-06-05 | Stop reason: HOSPADM

## 2019-06-05 RX ORDER — CEFAZOLIN SODIUM 1 G/3ML
INJECTION, POWDER, FOR SOLUTION INTRAMUSCULAR; INTRAVENOUS PRN
Status: DISCONTINUED | OUTPATIENT
Start: 2019-06-05 | End: 2019-06-05 | Stop reason: SURG

## 2019-06-05 RX ORDER — BUPIVACAINE HYDROCHLORIDE AND EPINEPHRINE 5; 5 MG/ML; UG/ML
INJECTION, SOLUTION PERINEURAL
Status: DISCONTINUED | OUTPATIENT
Start: 2019-06-05 | End: 2019-06-05 | Stop reason: HOSPADM

## 2019-06-05 RX ORDER — DEXAMETHASONE SODIUM PHOSPHATE 4 MG/ML
INJECTION, SOLUTION INTRA-ARTICULAR; INTRALESIONAL; INTRAMUSCULAR; INTRAVENOUS; SOFT TISSUE PRN
Status: DISCONTINUED | OUTPATIENT
Start: 2019-06-05 | End: 2019-06-05 | Stop reason: SURG

## 2019-06-05 RX ORDER — OXYCODONE HCL 5 MG/5 ML
10 SOLUTION, ORAL ORAL
Status: COMPLETED | OUTPATIENT
Start: 2019-06-05 | End: 2019-06-05

## 2019-06-05 RX ORDER — LIDOCAINE HYDROCHLORIDE AND EPINEPHRINE 10; 10 MG/ML; UG/ML
INJECTION, SOLUTION INFILTRATION; PERINEURAL
Status: DISCONTINUED | OUTPATIENT
Start: 2019-06-05 | End: 2019-06-05 | Stop reason: HOSPADM

## 2019-06-05 RX ORDER — OXYCODONE HCL 5 MG/5 ML
5 SOLUTION, ORAL ORAL
Status: COMPLETED | OUTPATIENT
Start: 2019-06-05 | End: 2019-06-05

## 2019-06-05 RX ADMIN — LIDOCAINE HYDROCHLORIDE 0.5 ML: 10 INJECTION, SOLUTION INFILTRATION; PERINEURAL at 08:47

## 2019-06-05 RX ADMIN — CEFAZOLIN 3 G: 1 INJECTION, POWDER, FOR SOLUTION INTRAVENOUS at 09:59

## 2019-06-05 RX ADMIN — DEXAMETHASONE SODIUM PHOSPHATE 4 MG: 4 INJECTION, SOLUTION INTRAMUSCULAR; INTRAVENOUS at 10:15

## 2019-06-05 RX ADMIN — SUCCINYLCHOLINE CHLORIDE 200 MG: 20 INJECTION, SOLUTION INTRAMUSCULAR; INTRAVENOUS at 10:03

## 2019-06-05 RX ADMIN — ONDANSETRON 4 MG: 2 INJECTION INTRAMUSCULAR; INTRAVENOUS at 10:15

## 2019-06-05 RX ADMIN — ROCURONIUM BROMIDE 50 MG: 10 INJECTION INTRAVENOUS at 10:03

## 2019-06-05 RX ADMIN — SODIUM CHLORIDE, POTASSIUM CHLORIDE, SODIUM LACTATE AND CALCIUM CHLORIDE: 600; 310; 30; 20 INJECTION, SOLUTION INTRAVENOUS at 08:47

## 2019-06-05 RX ADMIN — LIDOCAINE HYDROCHLORIDE 50 MG: 20 INJECTION, SOLUTION INFILTRATION; PERINEURAL at 10:03

## 2019-06-05 RX ADMIN — ACETAMINOPHEN 1000 MG: 500 TABLET, FILM COATED ORAL at 09:49

## 2019-06-05 RX ADMIN — BUPIVACAINE HYDROCHLORIDE AND EPINEPHRINE BITARTRATE 20 ML: 5; .0091 INJECTION, SOLUTION EPIDURAL; INTRACAUDAL; PERINEURAL at 10:09

## 2019-06-05 RX ADMIN — FENTANYL CITRATE 25 MCG: 50 INJECTION INTRAMUSCULAR; INTRAVENOUS at 12:04

## 2019-06-05 RX ADMIN — CELECOXIB 200 MG: 200 CAPSULE ORAL at 09:49

## 2019-06-05 RX ADMIN — MIDAZOLAM HYDROCHLORIDE 2 MG: 1 INJECTION, SOLUTION INTRAMUSCULAR; INTRAVENOUS at 10:00

## 2019-06-05 RX ADMIN — GABAPENTIN 300 MG: 300 CAPSULE ORAL at 09:49

## 2019-06-05 RX ADMIN — SUGAMMADEX 200 MG: 100 INJECTION, SOLUTION INTRAVENOUS at 11:15

## 2019-06-05 RX ADMIN — FENTANYL CITRATE 50 MCG: 50 INJECTION, SOLUTION INTRAMUSCULAR; INTRAVENOUS at 10:15

## 2019-06-05 RX ADMIN — OXYCODONE HYDROCHLORIDE 10 MG: 5 SOLUTION ORAL at 12:08

## 2019-06-05 RX ADMIN — FENTANYL CITRATE 100 MCG: 50 INJECTION, SOLUTION INTRAMUSCULAR; INTRAVENOUS at 10:03

## 2019-06-05 RX ADMIN — FENTANYL CITRATE 100 MCG: 50 INJECTION, SOLUTION INTRAMUSCULAR; INTRAVENOUS at 10:30

## 2019-06-05 RX ADMIN — PROPOFOL 200 MG: 10 INJECTION, EMULSION INTRAVENOUS at 10:03

## 2019-06-05 RX ADMIN — DEXAMETHASONE SODIUM PHOSPHATE 4 MG: 4 INJECTION, SOLUTION INTRAMUSCULAR; INTRAVENOUS at 10:00

## 2019-06-05 ASSESSMENT — PAIN SCALES - GENERAL: PAIN_LEVEL: 0

## 2019-06-05 NOTE — OR NURSING
1328 arrived to stage 2  Alert x 3 rt wrist coban wrapped, unable to move fingers but can feel her thumb, pink warm and cap r efill q 3 sec return. Pt denies pain or nausea.   1345 discharge instructions given to pt and friend, both verbalized understanding. Pt meets criteria for dc stage 2.

## 2019-06-05 NOTE — DISCHARGE INSTRUCTIONS
ACTIVITY: Rest and take it easy for the first 24 hours.  A responsible adult is recommended to remain with you during that time.  It is normal to feel sleepy.  We encourage you to not do anything that requires balance, judgment or coordination.    MILD FLU-LIKE SYMPTOMS ARE NORMAL. YOU MAY EXPERIENCE GENERALIZED MUSCLE ACHES, THROAT IRRITATION, HEADACHE AND/OR SOME NAUSEA.    FOR 24 HOURS DO NOT:  Drive, operate machinery or run household appliances.  Drink beer or alcoholic beverages.   Make important decisions or sign legal documents.    SPECIAL INSTRUCTIONS: Keep dressings clean, dry and intact No lifting anything heavier than 2 lbs with the operative hand Keep hand elevated and apply ice as much as possible in the first three days Do not operate a vehicle or machinery while taking narcotic pain medications Do not try to rotate to the wrist as there are pins in place preventing this from occuring Return to clinic in 10-14 days Please call the office with any questions [phone]***    DIET: To avoid nausea, slowly advance diet as tolerated, avoiding spicy or greasy foods for the first day.  Add more substantial food to your diet according to your physician's instructions.  Babies can be fed formula or breast milk as soon as they are hungry.  INCREASE FLUIDS AND FIBER TO AVOID CONSTIPATION.    SURGICAL DRESSING/BATHING: *Refer to special instructions**    FOLLOW-UP APPOINTMENT:  A follow-up appointment should be arranged with your doctor; call to schedule.    You should CALL YOUR PHYSICIAN if you develop:  Fever greater than 101 degrees F.  Pain not relieved by medication, or persistent nausea or vomiting.  Excessive bleeding (blood soaking through dressing) or unexpected drainage from the wound.  Extreme redness or swelling around the incision site, drainage of pus or foul smelling drainage.  Inability to urinate or empty your bladder within 8 hours.  Problems with breathing or chest pain.    You should call  785 if you develop problems with breathing or chest pain.  If you are unable to contact your doctor or surgical center, you should go to the nearest emergency room or urgent care center.  Physician's telephone #: *[phone]**    If any questions arise, call your doctor.  If your doctor is not available, please feel free to call the Surgical Center at {Surgical Dept Numbers:[id number]}.  The Center is open Monday through Friday from 7AM to 7PM.  You can also call the HEALTH HOTLINE open 24 hours/day, 7 days/week and speak to a nurse at [phone], or toll free at [phone].    A registered nurse may call you a few days after your surgery to see how you are doing after your procedure.    MEDICATIONS: Resume taking daily medication.  Take prescribed pain medication with food.  If no medication is prescribed, you may take non-aspirin pain medication if needed.  PAIN MEDICATION CAN BE VERY CONSTIPATING.  Take a stool softener or laxative such as senokot, pericolace, or milk of magnesia if needed.    Prescription given for *Hydrocodone, Senna**.  Last pain medication given at *Percocet at 1200**.    If your physician has prescribed pain medication that includes Acetaminophen (Tylenol), do not take additional Acetaminophen (Tylenol) while taking the prescribed medication.    Depression / Suicide Risk    As you are discharged from this [facility] facility, it is important to learn how to keep safe from harming yourself.    Recognize the warning signs:  · Abrupt changes in personality, positive or negative- including increase in energy   · Giving away possessions  · Change in eating patterns- significant weight changes-  positive or negative  · Change in sleeping patterns- unable to sleep or sleeping all the time   · Unwillingness or inability to communicate  · Depression  · Unusual sadness, discouragement and loneliness  · Talk of wanting to die  · Neglect of personal appearance   · Rebelliousness- reckless  behavior  · Withdrawal from people/activities they love  · Confusion- inability to concentrate     If you or a loved one observes any of these behaviors or has concerns about self-harm, here's what you can do:  · Talk about it- your feelings and reasons for harming yourself  · Remove any means that you might use to hurt yourself (examples: pills, rope, extension cords, firearm)  · Get professional help from the community (Mental Health, Substance Abuse, psychological counseling)  · Do not be alone:Call your Safe Contact- someone whom you trust who will be there for you.  · Call your local CRISIS HOTLINE 088-2494 or 925-644-2223  · Call your local Children's Mobile Crisis Response Team [place] (244) 046-5461 or www.[employer]  · Call the toll free National Suicide Prevention Hotlines   · National Suicide Prevention Lifeline 740-021-DJGO (8091)  · National Hope Line Network 800-SUICIDE (545-4099)

## 2019-06-05 NOTE — ANESTHESIA PROCEDURE NOTES
Airway  Date/Time: 6/5/2019 10:05 AM  Performed by: ANGEL LOPEZ  Authorized by: ANGEL LOPEZ     Location:  OR  Urgency:  Elective  Indications for Airway Management:  Anesthesia  Spontaneous Ventilation: absent    Sedation Level:  Deep  Preoxygenated: Yes    Patient Position:  Sniffing  Final Airway Type:  Endotracheal airway  Final Endotracheal Airway:  ETT  Cuffed: Yes    Technique Used for Successful ETT Placement:  Direct laryngoscopy  Insertion Site:  Oral  Blade Type:  Pamela  Laryngoscope Blade/Videolaryngoscope Blade Size:  3  ETT Size (mm):  7.0  Measured from:  Teeth  Placement Verified by: auscultation and capnometry    Cormack-Lehane Classification:  Grade I - full view of glottis  Number of Attempts at Approach:  1

## 2019-06-05 NOTE — OR NURSING
1132: To PACU from OR via gurney, sleeping, respirations spontaneous and non-labored via OPA. Icepack applied over c/d/i R wrist surgical dressings. Pain is tolerable, no nausea, sleepy, but arouses to voice.  1140: Tolerating decrease of supplemental O2, pain is still tolerable, no nausea.   1150: Pain still tolerable, no nausea, sleepy, but arousing to voice. Dressing still CDI.  1200: Pain is increased, no longer tolerable, pain medication given per MAR, no nausea.  1210: Pain not quite tolerable, oral pain medication given per MAR, no nausea.  1220: Pain more tolerable, no nausea, more awake, able to verify unreconciled medications with pt. Report given to BARBARA Benavides and she assumed care.  1250: Rcvd report back from BARBARA Benavides and reassumed care. Pt resting in the gurney, pain is tolerable, no nausea, dressing is CDI and arm remains elevated on a couple of pillows.   1300: Turned pt off supplemental O2, will monitor for tolerance.   1310: Tolerating room air, awake and alert, pain is tolerable, dressing is CDI. Meets criteria to transfer to Stage 2, report called to BARBARA Hou and pt readied for transfer.

## 2019-06-05 NOTE — ANESTHESIA POSTPROCEDURE EVALUATION
Patient: Froy Acosta    Procedure Summary     Date:  06/05/19 Room / Location:   OR 02 / SURGERY [facility]    Anesthesia Start:  0959 Anesthesia Stop:  1135    Procedures:       ARTHROSCOPY, WRIST (Right Wrist)      REPAIR OR RECONSTRUCTION, TRIANGULAR FIBROCARTILAGE COMPLEX, WRIST, ARTHROSCOPIC (Right Wrist)      OSTEOTOMY, ORTHOPEDIC - ULNAR SHORTENING (Right Wrist) Diagnosis:  (CONGENITAL POSITIVE ULNAR VARIANT OF WRIST)    Surgeon:  Skip Barnes M.D. Responsible Provider:  Skip Em D.O.    Anesthesia Type:  general ASA Status:  3          Final Anesthesia Type: general  Last vitals  BP   Blood Pressure: 128/80    Temp   36.2 °C (97.2 °F)    Pulse   Pulse: 64   Resp   16    SpO2   94 %      Anesthesia Post Evaluation    Patient location during evaluation: bedside  Patient participation: complete - patient participated  Level of consciousness: awake and alert  Pain score: 0    Airway patency: patent  Anesthetic complications: no  Cardiovascular status: adequate  Respiratory status: acceptable  Hydration status: acceptable

## 2019-06-05 NOTE — ANESTHESIA PROCEDURE NOTES
Peripheral Block  Performed by: ANGEL LOPEZ  Authorized by: ANGEL LOPEZ     Patient Location:  OR  Start Time:  6/5/2019 10:07 AM  End Time:  6/5/2019 10:14 AM  Reason for Block: at surgeon's request and post-op pain management    patient identified, IV checked, site marked, risks and benefits discussed, surgical consent, monitors and equipment checked, pre-op evaluation and timeout performed    Patient Position:  Supine  Prep: ChloraPrep    Block Region:  Upper Extremity  Upper Extremity - Block Type:  BRACHIAL PLEXUS block, Infraclavicular approach    Laterality:  Right  Procedures: ultrasound guided  Image captured, interpreted and electronically stored.    Block Type:  Single-shot  Needle Length:  100mm  Needle Gauge:  21 G  Needle Localization:  Ultrasound guidance  Injection Assessment:  Negative aspiration for heme, no paresthesia on injection, incremental injection, paresthesia-transient/resolved and local visualized surrounding nerve on ultrasound  Evidence of intravascular injection: No

## 2019-06-05 NOTE — OR NURSING
1225 Pt dozing, oxygen titrated to room air with O2 saturation between 86-89%. O2 at 1 liter reapplied with O2 sat 91-95%. 1240 Pt encouraged to cough and DB. 1246 No assessment changes. Pt dozing. Report given back to Zahraa JIMENEZ.

## 2019-06-05 NOTE — ANESTHESIA PREPROCEDURE EVALUATION
Relevant Problems   (+) Complex sleep apnea syndrome      Within Normal Limits   CARDIAC   ENDO   GI      NEURO   PULMONARY       Physical Exam    Airway   Mallampati: II  TM distance: >3 FB  Neck ROM: full       Cardiovascular - normal exam  Rhythm: regular  Rate: normal     Dental - normal exam         Pulmonary    Abdominal - normal exam     Neurological - normal exam                 Anesthesia Plan    ASA 3   ASA physical status 3 criteria: morbid obesity - BMI greater than or equal to 40    Plan - general       Airway plan will be ETT      Plan Factors:   Patient was not previously instructed to abstain from smoking on day of procedure.  Patient did not smoke on day of procedure.    Induction: intravenous          Informed Consent:    Anesthetic plan and risks discussed with patient.    Use of blood products discussed with: patient whom.

## 2019-06-05 NOTE — ANESTHESIA TIME REPORT
Anesthesia Start and Stop Event Times     Date Time Event    6/5/2019 0959 Anesthesia Start     1135 Anesthesia Stop        Responsible Staff  06/05/19    Name Role Begin End    Skip Em D.O. Anesth 0959 1135        Preop Diagnosis (Free Text):  Pre-op Diagnosis     CONGENITAL POSITIVE ULNAR VARIANT OF WRIST        Preop Diagnosis (Codes):  Diagnosis Information     Diagnosis Code(s):         Post op Diagnosis  Wrist pain  CONGENITAL POSITIVE ULNAR VARIANT OF WRIST    Premium Reason  Non-Premium    Comments:

## 2019-06-05 NOTE — ANESTHESIA QCDR
2019 [facility] Clinical Data Registry (for Quality Improvement)     Postoperative nausea/vomiting risk protocol (Adult = 18 yrs and Pediatric 3-17 yrs)- (430 and 463)  General inhalation anesthetic (NOT TIVA) with PONV risk factors: Yes  Provision of anti-emetic therapy with at least 2 different classes of agents: Yes   Patient DID NOT receive anti-emetic therapy and reason is documented in Medical Record:  N/A    Multimodal Pain Management- (AQI59)  Patient undergoing Elective Surgery (i.e. Outpatient, or ASC, or Prescheduled Surgery prior to Hospital Admission): Yes  Use of Multimodal Pain Management, two or more drugs and/or interventions, NOT including systemic opioids: Yes   Exception: Documented allergy to multiple classes of analgesics:  N/A    PACU assessment of acute postoperative pain prior to Anesthesia Care End- Applies to Patients Age = 18- (ABG7)  Initial PACU pain score is which of the following: < 7/10  Patient unable to report pain score: N/A    Post-anesthetic transfer of care checklist/protocol to PACU/ICU- (426 and 427)  Upon conclusion of case, patient transferred to which of the following locations: PACU/Non-ICU  Use of transfer checklist/protocol: Yes  Exclusion: Service Performed in Patient Hospital Room (and thus did not require transfer): N/A    PACU Reintubation- (AQI31)  General anesthesia requiring endotracheal intubation (ETT) along with subsequent extubation in OR or PACU: Yes  Required reintubation in the PACU: No   Extubation was a planned trial documented in the medical record prior to removal of the original airway device:  N/A    Unplanned admission to ICU related to anesthesia service up through end of PACU care- (MD51)  Unplanned admission to ICU (not initially anticipated at anesthesia start time): No

## 2019-06-07 NOTE — OP REPORT
DATE OF SERVICE:  06/05/2019    PREOPERATIVE DIAGNOSIS:  Right triangular fibrocartilage complex tear.    PREOPERATIVE DIAGNOSES:    1.  Right triangular fibrocartilage complex tear.  2.  Dorsal wrist capsule fraying.    PROCEDURE PERFORMED:  1.  Right wrist arthroscopy with triangular fibrocartilage complex repair.  2.  Right wrist arthroscopy with limited debridement.    SURGEON:  Skip Barnes MD    ANESTHESIA:    1.  General.  2.  Block for postoperative pain control.    TOURNIQUET TIME:  52 minutes.    TOURNIQUET PRESSURE:  250 mmHg.    ESTIMATED BLOOD LOSS:  2 mL.    COMPLICATIONS:  None.    INDICATIONS FOR PROCEDURE:  The patient is a very pleasant lady who has had   persistent right ulnar-sided wrist pain.  This has been recalcitrant to   nonoperative treatments including rest, NSAIDs, activity modification and   multiple steroid injections.  Additionally, she has MRI studies indicating a   triangular fibrocartilage complex tear.  For these reasons, the decision was   made to take her to the operating room today for the above-mentioned   procedure.    DESCRIPTION OF PROCEDURE:  On the day of surgery, the patient was seen in the   preoperative area where informed consent was obtained with all risks and   benefits of the procedure explained and all questions answered.  She wished to   proceed with the surgery.  The proper site was marked.  She was subsequently   taken to the operating room and placed in the supine position with all bony   prominences well padded.  General endotracheal anesthesia was induced.  A   tourniquet was placed on the right upper extremity, was then prepped and   draped in the usual sterile fashion.    A timeout was performed with all persons in attendance agreeing on the proper   patient, proper surgical site and proper surgery to be performed.    An Esmarch was used to exsanguinate the right upper extremity and the   tourniquet was insufflated to 250 mmHg.  The standard 3-4  portal was placed.    The arthroscope was placed into the wrist joint and the wrist was then   inspected.  There was found to be some fraying on the dorsal capsule of the   wrist joint.  Additionally, there was a small tear of the TFCC at the radial   sigmoid notch.  There was also found to be a TFCC tear at the ulnar fovea.    I then placed a standard 4-5 portal and I was able to place the shaver into   the wrist joint.  I performed a limited debridement of the wrist joint and   debrided the fraying at the dorsal capsule.  I then debrided the TFCC tear in   preparation for TFCC repair.    I then used a Smith and Nephew suture repair technique to perform the TFCC   repair.  I placed a spinal needle into the TFCC from the ulnar aspect.  This   was primarily on the ulnar side of the TFCC.  I then placed an additional   needle into the TFCC through the more dorsal aspect of the tear.  I then   placed a metallic suture loop into the McConnells needle and then was able to place   a #2 PDS suture through the other spinal needle and I was able to grasp this   with the metallic suture ____.  In this fashion, I was able to shuttle the   suture in a horizontal mattress fashion through the TFCC.  The ends of the   suture were then outside the skin.  I made a longitudinal incision directly   over the TFCC.  Sharp and blunt dissection was taken down to the level of the   joint capsule.  The free ends of the suture were then pulled into the wound.    I then used an arthroscopic knot pusher in order to secure the TFCC repair.    The arthroscope was used to visualize the repair and ensure good tensioning of   the TFCC.  I also ensured that the sensory nerve was not deep to the suture.    The suture was cut to length.  The wound was irrigated with normal saline.    The arthroscope was removed.  All wounds were repaired using 4-0 nylon in a   horizontal mattress fashion.    Under fluoroscopic guidance, I then placed two 0.062 K-wires from  ulnar to   radial in order to maintain the wrist in a pronated position while healing.    Final x-rays were obtained.  K-wires were then bent and cut to length and caps   were placed.  .  Wounds were then dressed with Xeroform, 4x4, sterile cast   padding and a loosely approximated Coban dressing.  The tourniquet was   deflated.  General endotracheal anesthesia was reversed.  She was taken to the   PACU in good and stable condition.    DISPOSITION:  She will be discharged home on a regular diet.  She will have a   2-pound lifting restriction of the right upper extremity.  She could apply ice   and elevate as much as possible for the next 72 hours.  She will return to   the clinic in 10-14 days.  She was prescribed narcotic pain medication and   instructed not to drive or operate machinery while taking this medication.       ____________________________________     MD JEFFERY Ramirez / CYNDIE    DD:  06/07/2019 09:38:31  DT:  06/07/2019 10:27:08    D#:  [id number]  Job#:  [id number]

## 2020-05-28 ENCOUNTER — TELEPHONE (OUTPATIENT)
Dept: NEUROLOGY | Facility: MEDICAL CENTER | Age: 40
End: 2020-05-28

## 2020-05-28 NOTE — TELEPHONE ENCOUNTER
Patient called for refills on her medication. Called patient and LVM advising refills can be be filled until she can be seen as it was been almost 18 months. Request from PCP.

## 2020-06-28 ENCOUNTER — OFFICE VISIT (OUTPATIENT)
Dept: URGENT CARE | Facility: PHYSICIAN GROUP | Age: 40
End: 2020-06-28
Payer: MEDICAID

## 2020-06-28 VITALS
HEART RATE: 104 BPM | TEMPERATURE: 98.6 F | RESPIRATION RATE: 18 BRPM | HEIGHT: 68 IN | DIASTOLIC BLOOD PRESSURE: 76 MMHG | BODY MASS INDEX: 44.41 KG/M2 | SYSTOLIC BLOOD PRESSURE: 118 MMHG | OXYGEN SATURATION: 94 % | WEIGHT: 293 LBS

## 2020-06-28 DIAGNOSIS — M79.89 LEG SWELLING: ICD-10-CM

## 2020-06-28 PROCEDURE — 99214 OFFICE O/P EST MOD 30 MIN: CPT | Performed by: NURSE PRACTITIONER

## 2020-06-28 ASSESSMENT — ENCOUNTER SYMPTOMS
CHILLS: 0
SHORTNESS OF BREATH: 0
COUGH: 0
SORE THROAT: 0
HEARTBURN: 0
CHANGE IN BOWEL HABIT: 0
CONSTIPATION: 0
WEAKNESS: 0
LEG SWELLING: 1
BACK PAIN: 0
NAUSEA: 0
DIZZINESS: 0
DIARRHEA: 0
HEADACHES: 0
VOMITING: 0
FATIGUE: 0
TINGLING: 0
NUMBNESS: 0
MEMORY LOSS: 0
WHEEZING: 0
MYALGIAS: 0
FEVER: 0
ORTHOPNEA: 0
PALPITATIONS: 0

## 2020-06-28 NOTE — PROGRESS NOTES
"Subjective:      Froy Acosta is a 39 y.o. female who presents with Leg Swelling (left leg)            Patient presents to urgent care with complaint of left calf swelling x3 days.  Patient reports that on Friday she felt she had what she thought was a muscle cramp and she tried to walk herself through it.  As she continued to walk the pain continued to worsen.  She denies history of DVT/PE.  No recent travel or surgery.  Denies chest pain, shortness of breath.    Leg Swelling   This is a new problem. Episode onset: In the past 3 days. The problem occurs constantly. The problem has been gradually worsening. Pertinent negatives include no change in bowel habit, chest pain, chills, coughing, fatigue, fever, headaches, myalgias, nausea, numbness, rash, sore throat, vomiting or weakness. The symptoms are aggravated by walking and standing (Palpation). She has tried nothing for the symptoms. The treatment provided no relief.       Review of Systems   Constitutional: Negative for chills, fatigue and fever.   HENT: Negative for ear pain and sore throat.    Respiratory: Negative for cough, shortness of breath and wheezing.    Cardiovascular: Positive for leg swelling. Negative for chest pain, palpitations and orthopnea.   Gastrointestinal: Negative for change in bowel habit, constipation, diarrhea, heartburn, nausea and vomiting.   Musculoskeletal: Negative for back pain, joint pain and myalgias.   Skin: Negative for rash.   Neurological: Negative for dizziness, tingling, weakness, numbness and headaches.   Psychiatric/Behavioral: Negative for memory loss and suicidal ideas.   All other systems reviewed and are negative.         Objective:     /76   Pulse (!) 104   Temp 37 °C (98.6 °F) (Temporal)   Resp 18   Ht 1.715 m (5' 7.5\")   Wt (!) 162.8 kg (359 lb)   SpO2 94%   BMI 55.40 kg/m²      Physical Exam  Vitals signs reviewed.   Constitutional:       Appearance: Normal appearance.   HENT:      Head: " Normocephalic.      Right Ear: External ear normal.      Left Ear: External ear normal.      Nose: Nose normal. No congestion.      Mouth/Throat:      Mouth: Mucous membranes are moist.   Eyes:      Extraocular Movements: Extraocular movements intact.      Conjunctiva/sclera: Conjunctivae normal.   Neck:      Musculoskeletal: Normal range of motion.   Cardiovascular:      Rate and Rhythm: Regular rhythm. Tachycardia present.      Pulses: Normal pulses.      Heart sounds: Normal heart sounds. No murmur.   Pulmonary:      Effort: Pulmonary effort is normal. No respiratory distress.      Breath sounds: Normal breath sounds. No wheezing, rhonchi or rales.   Abdominal:      General: Bowel sounds are normal.      Palpations: Abdomen is soft.      Tenderness: There is no abdominal tenderness.   Musculoskeletal: Normal range of motion.      Left lower leg: She exhibits tenderness and swelling. She exhibits no bony tenderness.   Lymphadenopathy:      Cervical: No cervical adenopathy.   Skin:     General: Skin is warm and dry.      Capillary Refill: Capillary refill takes less than 2 seconds.   Neurological:      Mental Status: She is alert and oriented to person, place, and time.   Psychiatric:         Mood and Affect: Mood normal.         Behavior: Behavior normal.       Vascular Laboratory   CONCLUSIONS   Fair image quality   No superficial or deep venous thrombosis in the left lower extremity.   MARSHA JACKSON   Exam Date: 2020 13:24   Room #: Out Patient   Priority: Stat   Ht (in): Wt (lb):   Ordering Physician: ROMAIN ZUÑIGA   Referring Physician: [id number] Lemus   Sonographer: Manuela Kearns RVT   Study Type: Complete Unilateral   Technical Quality: Fair   Age: 39 Gender: F   MRN: [id number]   : 1980 BSA:   Indications: Pain in left leg   CPT Codes: 81193   ICD Codes: M79.605   History: Left lower extremity pain.   Limitations:   PROCEDURES:   Left lower extremity venous duplex imaging.   The  following venous structures were evaluated: common femoral, profunda   femoral, greater saphenous, femoral, popliteal , peroneal and posterior   tibial veins.   Serial compression, augmentation maneuvers, color and spectral Doppler   flow evaluations were performed.   FINDINGS:   Left lower extremity -.   No superficial or deep venous thrombosis.   Complete color filling and compressibility with normal venous flow dynamics   including spontaneous flow, response to augmentation maneuvers, and   respiratory phasicity.   Flow was evaluated in the contralateral common femoral vein and normal   venous flow dynamics including spontaneous flow, respiratory phasic   variation and augmentation were demonstrated.             Assessment/Plan:     1. Leg swelling  Supportive care, differential diagnoses, and indications for immediate follow-up discussed with patient.    I have remote recommended that patient present to ED for evaluation for DVT.      Patient declines ED evaluation. Vitals stable.  She requests that we schedule her for soonest available ultrasound appointment which is made for her tomorrow. Strict ED return precautions discussed.  Patient expresses understanding and agrees to plan.     Please note that this dictation was created using voice recognition software. I have made every reasonable attempt to correct obvious errors, but I expect that there are errors of grammar and possibly content that I did not discover before finalizing the note.    - US-EXTREMITY VENOUS LOWER UNILAT LEFT; Future

## 2020-06-29 ENCOUNTER — HOSPITAL ENCOUNTER (OUTPATIENT)
Dept: RADIOLOGY | Facility: MEDICAL CENTER | Age: 40
End: 2020-06-29
Attending: NURSE PRACTITIONER
Payer: MEDICAID

## 2020-06-29 DIAGNOSIS — M79.89 LEG SWELLING: ICD-10-CM

## 2020-06-29 PROCEDURE — 93971 EXTREMITY STUDY: CPT | Mod: LT

## 2020-06-29 PROCEDURE — 93971 EXTREMITY STUDY: CPT | Mod: 26,LT | Performed by: INTERNAL MEDICINE

## 2021-01-08 ENCOUNTER — OFFICE VISIT (OUTPATIENT)
Dept: URGENT CARE | Facility: PHYSICIAN GROUP | Age: 41
End: 2021-01-08
Payer: MEDICAID

## 2021-01-08 VITALS
DIASTOLIC BLOOD PRESSURE: 60 MMHG | RESPIRATION RATE: 18 BRPM | BODY MASS INDEX: 44.41 KG/M2 | OXYGEN SATURATION: 95 % | HEIGHT: 68 IN | WEIGHT: 293 LBS | HEART RATE: 95 BPM | SYSTOLIC BLOOD PRESSURE: 110 MMHG | TEMPERATURE: 98.9 F

## 2021-01-08 DIAGNOSIS — M54.50 ACUTE LOW BACK PAIN, UNSPECIFIED BACK PAIN LATERALITY, UNSPECIFIED WHETHER SCIATICA PRESENT: ICD-10-CM

## 2021-01-08 PROCEDURE — 99214 OFFICE O/P EST MOD 30 MIN: CPT | Performed by: FAMILY MEDICINE

## 2021-01-08 RX ORDER — CYCLOBENZAPRINE HCL 10 MG
TABLET ORAL
Qty: 21 TAB | Refills: 0 | Status: SHIPPED | OUTPATIENT
Start: 2021-01-08 | End: 2021-01-11

## 2021-01-08 RX ORDER — KETOROLAC TROMETHAMINE 30 MG/ML
60 INJECTION, SOLUTION INTRAMUSCULAR; INTRAVENOUS ONCE
Status: COMPLETED | OUTPATIENT
Start: 2021-01-08 | End: 2021-01-08

## 2021-01-08 RX ADMIN — KETOROLAC TROMETHAMINE 60 MG: 30 INJECTION, SOLUTION INTRAMUSCULAR; INTRAVENOUS at 16:39

## 2021-01-08 ASSESSMENT — PAIN SCALES - GENERAL: PAINLEVEL: 8=MODERATE-SEVERE PAIN

## 2021-01-08 NOTE — PROGRESS NOTES
Chief Complaint:    Chief Complaint   Patient presents with   • Back Pain     Lumbar px, x1day        History of Present Illness:    This is a new problem. She has pain in lower back after bending over to tie her shoe this AM. Some pain in upper legs bilaterally, but not past knees. No loss of bowel control. Has chronic urine incontinence issues, nothing new. No meds used for this. Toradol IM has worked and been tolerated well for migraines in the past. She thinks she has had Flexeril in the distant past without problem.    MRI L-spine (10/18/18) noted below.    MR-LUMBAR SPINE-W/O  Order: [id number]  Status:  Final result   Visible to patient:  No (inaccessible in MyChart) Next appt:  01/11/2021 at 03:40 PM in Neurology (Chela Barr M.D.) Dx:  Acute back pain with sciatica, left  Details    Reading Physician Reading Date Result Priority   Murphy Elizabeth M.D.  [phone] 10/18/2018 Routine      Narrative & Impression        10/18/2018 12:33 PM     HISTORY/REASON FOR EXAM:  Back pain with numbness in buttock area x 2 years        TECHNIQUE/EXAM DESCRIPTION:  MRI of the lumbar spine without contrast.     The study was performed on a [employer] Signa 1.5 Karen MRI scanner.  T1 sagittal, T2 fast spin-echo sagittal, and T2 axial images were obtained of the lumbar spine.     COMPARISON:  5/2/2017.     FINDINGS:     Alignment is anatomic.     The vertebral body heights are preserved.     Bone marrow signal is normal.     Conus is normal in caliber, signal, and location at L1.     Retroperitoneal and paravertebral soft tissues are normal.     L1-2:  No posterior disc contour abnormality. No facet arthropathy.  Patent spinal canal. Patent neuroforamen bilaterally.     L2-3:  No posterior disc contour abnormality. No facet arthropathy.  Patent spinal canal. Patent neuroforamen bilaterally.     L3-4:  Disc desiccation. No posterior disc contour abnormality. No facet arthropathy.  Patent spinal canal. Patent neuroforamen  bilaterally.     L4-5: Disc desiccation with central disc protrusion. Moderate facet arthropathy.  Patent spinal canal. Moderate narrowing of the left neuroforamen. Mild narrowing of the right neuroforamen.     L5-S1: Disc desiccation and diffuse disc bulge. Moderate facet arthropathy.  Patent spinal canal. Mild narrowing of neuroforamen bilaterally.     Five non-rib bearing lumbar vertebral bodies are assumed with the L5 vertebral body articulating with the sacrum. Accurate numbering of the spine levels would require imaging of the thoracolumbar spine to count ribs.        IMPRESSION:           No significant interval change.     Mild degenerative disc disease from L3-4 to L5-S1.     No spinal canal narrowing.             Review of Systems:    Constitutional: Negative for fever, chills, and diaphoresis.   Eyes: Negative for change in vision, photophobia, pain, redness, and discharge.  ENT: Negative for ear pain, ear discharge, hearing loss, tinnitus, nasal congestion, nosebleeds, and sore throat.    Respiratory: Negative for cough, hemoptysis, sputum production, shortness of breath, wheezing, and stridor.    Cardiovascular: Negative for chest pain, palpitations, orthopnea, claudication, leg swelling, and PND.   Gastrointestinal: Negative for abdominal pain, nausea, vomiting, diarrhea, constipation, blood in stool, and melena.   Genitourinary: See HPI.  Musculoskeletal: See HPI.   Skin: Negative for rash and itching.   Neurological: See HPI.   Endo: Diabetic, on medication.  Heme: Does not bruise/bleed easily.   Psychiatric/Behavioral: Negative for depression, suicidal ideas, hallucinations, memory loss and substance abuse. The patient is not nervous/anxious and does not have insomnia.        Past Medical History:    Past Medical History:   Diagnosis Date   • Back pain     lower back   • Degenerative disc disease    • Elevated cholesterol     history of per patient   • Obesity    • Sleep apnea     Wears CPAP   •  Snoring    • Type II or unspecified type diabetes mellitus without mention of complication, not stated as uncontrolled     diet, oral agents     Past Surgical History:    Past Surgical History:   Procedure Laterality Date   • PB WRIST ARTHROSCOP,EXCIS TRIANG CART Right 6/5/2019    Procedure: REPAIR OR RECONSTRUCTION, TRIANGULAR FIBROCARTILAGE COMPLEX, WRIST, ARTHROSCOPIC;  Surgeon: Skip Barnes M.D.;  Location: [facility];  Service: Orthopedics   • WRIST ARTHROSCOPY Right 6/5/2019    Procedure: ARTHROSCOPY, WRIST;  Surgeon: Skip Barnes M.D.;  Location: [facility];  Service: Orthopedics   • ORTHOPEDIC OSTEOTOMY Right 6/5/2019    Procedure: OSTEOTOMY, ORTHOPEDIC - ULNAR SHORTENING;  Surgeon: Skip Barnes M.D.;  Location: [facility];  Service: Orthopedics   • ANKLE ORIF Left 1/10/2019    Procedure: ANKLE ORIF- LATERAL MALLEOLUS WITH DELTOID REPAIR  ;  Surgeon: Wm Arian Ngo M.D.;  Location: [facility];  Service: Orthopedics   • YONATHAN BY LAPAROSCOPY  12/2001     Social History:    Social History     Socioeconomic History   • Marital status:      Spouse name: Not on file   • Number of children: Not on file   • Years of education: Not on file   • Highest education level: Not on file   Occupational History   • Not on file   Social Needs   • Financial resource strain: Not on file   • Food insecurity     Worry: Not on file     Inability: Not on file   • Transportation needs     Medical: Not on file     Non-medical: Not on file   Tobacco Use   • Smoking status: Current Every Day Smoker     Types: Cigars   • Smokeless tobacco: Never Used   • Tobacco comment: smokes socially, one pack every two month; cigars once a month   Substance and Sexual Activity   • Alcohol use: Not Currently   • Drug use: No   • Sexual activity: Not on file   Lifestyle   • Physical activity     Days per week: Not on file     Minutes per session: Not on file  "  • Stress: Not on file   Relationships   • Social connections     Talks on phone: Not on file     Gets together: Not on file     Attends Oriental orthodox service: Not on file     Active member of club or organization: Not on file     Attends meetings of clubs or organizations: Not on file     Relationship status: Not on file   • Intimate partner violence     Fear of current or ex partner: Not on file     Emotionally abused: Not on file     Physically abused: Not on file     Forced sexual activity: Not on file   Other Topics Concern   • Not on file   Social History Narrative   • Not on file     Family History:    Family History   Problem Relation Age of Onset   • Other Mother         lupus   • Other Father         accident   • Sleep Apnea Neg Hx      Medications:    Current Outpatient Medications on File Prior to Visit   Medication Sig Dispense Refill   • metformin (GLUCOPHAGE) 1000 MG tablet metformin 1,000 mg tablet       No current facility-administered medications on file prior to visit.      Allergies:    No Known Allergies      Vitals:    Vitals:    01/08/21 1608   BP: 110/60   Pulse: 95   Resp: 18   Temp: 37.2 °C (98.9 °F)   TempSrc: Temporal   SpO2: 95%   Weight: (!) 161.9 kg (357 lb)   Height: 1.715 m (5' 7.5\")       Physical Exam:    Constitutional: Vital signs reviewed. Appears well-developed and well-nourished. No acute distress.   Eyes: Sclera white, conjunctivae clear.  ENT: External ears normal. Hearing normal.  Cardiovascular: Peripheral pulses 2+.   Pulmonary/Chest: Respirations non-labored.  Musculoskeletal: Decreased lumbar range of motion due to low back pain. Normal gait. No significant tenderness to palpation. No muscular atrophy or weakness.  Neurological: Alert and oriented to person, place, and time. Muscle tone normal. Coordination normal. Light touch and sensation normal.   Skin: No rashes or lesions. Warm, dry, normal turgor.  Psychiatric: Normal mood and affect. Behavior is normal. Judgment and " thought content normal.       Assessment / Plan:    1. Acute low back pain, unspecified back pain laterality, unspecified whether sciatica present  - ketorolac (TORADOL) injection 60 mg  - cyclobenzaprine (FLEXERIL) 10 mg Tab; 1 TAB BY MOUTH EVERY 8 HOURS ONLY IF NEEDED FOR PAIN, MUSCLE SPASM, AND/OR MUSCLE TIGHTNESS. MAY CAUSE DROWSINESS.  Dispense: 21 Tab; Refill: 0      Discussed with her DDX, management options, and risks, benefits, and alternatives to treatment plan agreed upon.    Likely MSK inflammation and/or muscle tightness/spasm as cause of symptoms.     Rec'd relative rest.    Agreeable to medications given and prescribed.    Discussed expected course of duration, time for improvement, and to seek follow-up in Emergency Room, urgent care, or with PCP if getting worse at any time or not improving within expected time frame.

## 2021-01-11 ENCOUNTER — OFFICE VISIT (OUTPATIENT)
Dept: NEUROLOGY | Facility: MEDICAL CENTER | Age: 41
End: 2021-01-11
Attending: PSYCHIATRY & NEUROLOGY
Payer: MEDICAID

## 2021-01-11 VITALS
BODY MASS INDEX: 45.99 KG/M2 | TEMPERATURE: 98.1 F | DIASTOLIC BLOOD PRESSURE: 68 MMHG | HEIGHT: 67 IN | HEART RATE: 84 BPM | SYSTOLIC BLOOD PRESSURE: 104 MMHG | WEIGHT: 293 LBS | RESPIRATION RATE: 16 BRPM | OXYGEN SATURATION: 94 %

## 2021-01-11 DIAGNOSIS — E66.01 MORBID OBESITY WITH BMI OF 45.0-49.9, ADULT (HCC): ICD-10-CM

## 2021-01-11 DIAGNOSIS — G43.009 MIGRAINE WITHOUT AURA AND WITHOUT STATUS MIGRAINOSUS, NOT INTRACTABLE: ICD-10-CM

## 2021-01-11 DIAGNOSIS — G51.0 BELL'S PALSY: ICD-10-CM

## 2021-01-11 DIAGNOSIS — G47.31 COMPLEX SLEEP APNEA SYNDROME: ICD-10-CM

## 2021-01-11 PROCEDURE — 99211 OFF/OP EST MAY X REQ PHY/QHP: CPT | Performed by: PSYCHIATRY & NEUROLOGY

## 2021-01-11 PROCEDURE — 99215 OFFICE O/P EST HI 40 MIN: CPT | Performed by: PSYCHIATRY & NEUROLOGY

## 2021-01-11 RX ORDER — ZOLMITRIPTAN 5 MG/1
5 TABLET, FILM COATED ORAL PRN
Qty: 10 TAB | Refills: 3 | Status: SHIPPED | OUTPATIENT
Start: 2021-01-11 | End: 2021-01-13

## 2021-01-11 ASSESSMENT — ENCOUNTER SYMPTOMS
BRUISES/BLEEDS EASILY: 0
SHORTNESS OF BREATH: 0
FEVER: 0
EYE DISCHARGE: 0
HALLUCINATIONS: 0
WEIGHT LOSS: 0
SORE THROAT: 0
FALLS: 0
ABDOMINAL PAIN: 0

## 2021-01-11 ASSESSMENT — PATIENT HEALTH QUESTIONNAIRE - PHQ9: CLINICAL INTERPRETATION OF PHQ2 SCORE: 0

## 2021-01-12 ENCOUNTER — TELEPHONE (OUTPATIENT)
Dept: NEUROLOGY | Facility: MEDICAL CENTER | Age: 41
End: 2021-01-12

## 2021-01-12 NOTE — TELEPHONE ENCOUNTER
The [facility] pharmacy called stating they don't carry the Zomig and they only have the sumatriptan tablet. I called the pt and she said for us to call in the sumatriptan to the same pharmacy.

## 2021-01-12 NOTE — PROGRESS NOTES
Chief Complaint   Patient presents with   • Follow-Up     Chronic migraine     Patient is referred by primary care for initial consult.    History of present illness:  Froy Acosta 40 y.o. female presents today for headache.   History is obtained from patient.  and Patient is accompanied by Self.   Prior patient of LUDA Viveros, [facility].  Works in kitchen as .     Duration/timing: onset was about 2010, duration of hours, 15/30 headaches days, all migraines  Context: Migraine   Location: Holocephalic  Quality: Pounding  Severity: Severe  Modifying factors: improved with rest and sleep, medications as documented below  Associated signs/symptoms: N/V, blurry vision, +snoring with history of complex obstructive sleep apnea compliant with CPAP, extreme photosensitivity  Denies: bladder incontinence, bowel incontinence, dizziness, weakness, numbness/tingling, swallowing difficulties, speech disturbance, incoordination, depression, anxiety, hearing loss, loss of consciousness, hallucinations, seizures, abnormal movements, autonomic symptoms and falls,aura, history of MI or stroke    Patient has tried:  -Zomig - worked well without side effects, pharmacy does not carry  -Relpax - worked well   -Ibuprofen  -Magnesium recommended but not tried  -Never took preventative  -Sumatriptan 100 mg as needed substituted for Zomig    Past medical history:   Past Medical History:   Diagnosis Date   • Back pain     lower back   • Degenerative disc disease    • Elevated cholesterol     history of per patient   • Obesity    • Sleep apnea     Wears CPAP   • Snoring    • Type II or unspecified type diabetes mellitus without mention of complication, not stated as uncontrolled     diet, oral agents       Past surgical history:   Past Surgical History:   Procedure Laterality Date   • PB WRIST ARTHROSCOP,EXCIS TRIANG CART Right 6/5/2019    Procedure: REPAIR OR RECONSTRUCTION, TRIANGULAR FIBROCARTILAGE COMPLEX,  WRIST, ARTHROSCOPIC;  Surgeon: Skip Barnes M.D.;  Location: [facility];  Service: Orthopedics   • WRIST ARTHROSCOPY Right 6/5/2019    Procedure: ARTHROSCOPY, WRIST;  Surgeon: Skip Barnes M.D.;  Location: [facility];  Service: Orthopedics   • ORTHOPEDIC OSTEOTOMY Right 6/5/2019    Procedure: OSTEOTOMY, ORTHOPEDIC - ULNAR SHORTENING;  Surgeon: Skip Barnes M.D.;  Location: [facility];  Service: Orthopedics   • ANKLE ORIF Left 1/10/2019    Procedure: ANKLE ORIF- LATERAL MALLEOLUS WITH DELTOID REPAIR  ;  Surgeon: Wm Arian Ngo M.D.;  Location: [facility];  Service: Orthopedics   • YONATHAN BY LAPAROSCOPY  12/2001       Family history:   Family History   Problem Relation Age of Onset   • Other Mother         lupus   • Other Father         accident   • Sleep Apnea Neg Hx    Mom maybe has migraines.     Social history:   Tobacco Use   • Smoking status: Former Smoker     Types: Cigars   • Smokeless tobacco: Never Used   • Tobacco comment: smokes socially, one pack every two month; cigars once a month   Substance and Sexual Activity   • Alcohol use: Not Currently   • Drug use: No       Current medications:   Current Outpatient Medications   Medication   • zolmitriptan (ZOMIG) 5 MG tablet   • metformin (GLUCOPHAGE) 1000 MG tablet     No current facility-administered medications for this visit.        Medication Allergy:  No Known Allergies    Review of Systems   Constitutional: Negative for fever and weight loss.   HENT: Negative for sore throat.    Eyes: Negative for discharge.   Respiratory: Negative for shortness of breath.    Cardiovascular: Negative for leg swelling.   Gastrointestinal: Negative for abdominal pain.   Genitourinary: Negative for dysuria.   Musculoskeletal: Negative for falls.   Skin: Negative for rash.   Neurological:        As per HPI   Endo/Heme/Allergies: Does not bruise/bleed easily.   Psychiatric/Behavioral:  "Negative for hallucinations.       Physical examination:   Vitals:    01/11/21 1539   BP: 104/68   BP Location: Left arm   Patient Position: Sitting   BP Cuff Size: Adult   Pulse: 84   Resp: 16   Temp: 36.7 °C (98.1 °F)   TempSrc: Temporal   SpO2: 94%   Weight: (!) 160.1 kg (353 lb)   Height: 1.702 m (5' 7\")     General: Patient in well nourished in no apparent distress. Morbid obesity.  Eyes: Ophthalmoscopic examination performed but discs cannot be visualized well enough to characterize bilaterally.  HENT: Normocephalic, atraumatic. Mallapatic score 2  Cardiovascular: No lower extremity edema.  Respiratory: Normal respiratory effort.   Skin: No appreciable signs of acute rashes or bruising.   Musculoskeletal: No signs of joint or muscle swelling.   Psychiatric: Pleasant.     NEUROLOGICAL EXAM:   Mental status: Awake, alert and fully oriented to person, place, time and situation. Normal attention, concentration and fund of knowledge for education level.   Speech and language: Speech is fluent without errors and clear.  Cranial nerve exam:  II: Pupils are equally round and reactive to light. Visual fields are intact by confrontation.  III, IV, VI: EOMI, no diplopia, no ptosis.  V: Sensation to light touch is normal over V1-3 distributions bilaterally.  .  VII: Facial movements are symmetrical. There is no facial droop.  and Eye closure strength is normal.  VIII: Hearing intact to soft speech and finger rub bilaterally  IX: Palate elevates symmetrically, uvula is midline. Dysarthria is not present.  XI: Shoulder shrug are symmetrical and strong.   XII: Tongue protrudes midline.    Motor exam:  Muscle tone is normal in all 4 limbs. and No abnormal movements appreciated.    Muscle strength:     Right  Left  Deltoid   5/5  5/5      Biceps   5/5  5/5  Triceps  5/5  5/5   Wrist extensors 5/5  5/5  Wrist flexors  5/5  5/5     5/5  5/5  Interossei  5/5  5/5  Thenar (APB)  NT/5  NT/5   Hip " flexors  5/5  5/5  Quadriceps  5/5  5/5    Hamstrings  5/5  5/5  Dorsiflexors  5/5  5/5  Plantarflexors  5/5  5/5  Toe extension  NT/5  NT/5  NT = not tested    Sensory exam:  Intact to Light touch and Temperature in bilateral upper and lower extremity.    Reflexes:       Right  Left  Biceps   1/4  1/4  Triceps  1/4  1/4  Brachioradialis 1/4  1/4  Knee jerk  0/4  0/4  Ankle jerk  0/4  0/4   bilateral toes are downgoing to plantar stimulation..      Coordination: shows a normal finger-nose-finger   Gait: stance due to body habitus, otherwise stable with good stride      ANCILLARY DATA REVIEWED:   Lab Data Review:  Lab Results   Component Value Date/Time    WBC 9.6 08/01/2014 03:45 AM    RBC 4.83 08/01/2014 03:45 AM    HEMOGLOBIN 14.8 08/01/2014 03:45 AM    HEMATOCRIT 44.6 08/01/2014 03:45 AM    MCV 92.5 08/01/2014 03:45 AM    MCH 30.7 08/01/2014 03:45 AM    MCHC 33.2 08/01/2014 03:45 AM    MPV 8.5 08/01/2014 03:45 AM    NEUTSPOLYS 80.7 (H) 07/31/2014 02:39 AM    LYMPHOCYTES 15.5 (L) 07/31/2014 02:39 AM    MONOCYTES 3.4 07/31/2014 02:39 AM    EOSINOPHILS 0.2 07/31/2014 02:39 AM    BASOPHILS 0.2 07/31/2014 02:39 AM      Lab Results   Component Value Date/Time    SODIUM 135 06/03/2019 09:29 AM    POTASSIUM 3.9 06/03/2019 09:29 AM    CHLORIDE 103 06/03/2019 09:29 AM    CO2 24 06/03/2019 09:29 AM    GLUCOSE 121 (H) 06/03/2019 09:29 AM    BUN 11 06/03/2019 09:29 AM    CREATININE 0.68 06/03/2019 09:29 AM     Lab Results   Component Value Date/Time    ASTSGOT 17 07/29/2014 2145    ALTSGPT 18 07/29/2014 2145    ALKPHOSPHAT 65 07/29/2014 2145    ALBUMIN 4.0 07/29/2014 2145     Lab Results   Component Value Date/Time    HBA1C 6.5 (H) 01/09/2019 02:31 PM        Imaging:   MRI brain with without contrast 2014: Unremarkable MRI of the brain with and without contrast enhancement.  Records reviewed: Chart reviewed, patient of LUDA Viveros.  Last seen in November 2018.  Pression was migraine headache.        ASSESSMENT AND  PLAN:    1. Migraine without aura and without status migrainosus, not intractable: Patient with a chronic history of headaches meeting migraine criteria previously treated with border of medication with good benefit.  Currently her frequency is approximately half per month which could justify preventative medication but after discussion with regards to treatment options including but limited limited to oral therapies injection therapies patient declined.  There is no focal neurological deficits to suggest secondary etiology.  MRI brain with and without contrast in 2014 was unremarkable for secondary structural causes.  Given her morbid obesity, caution with regards to medications with me worsen weight gain.  Previously tolerated Zomig, will continue.  - zolmitriptan (ZOMIG) 5 MG tablet; Take 1 Tab by mouth as needed for Migraine.  Dispense: 10 Tab; Refill: 3 -sumatriptan 100 mg as needed headache with 3 refills, 10 tablets due to pharmacy not caring Zomig  -Reevaluate readiness for preventive medications, consider CGRP antagonists/magnesium due to side effect profile    2. Morbid obesity with BMI of 45.0-49.9, adult (HCC): Caution with medications which may cause weight gain    3. Complex sleep apnea syndrome: Encourage compliance with CPAP    4. Bell's palsy: History of, without residual deficits on exam    FOLLOW-UP: Return in about 3 months (around 4/11/2021) for virtually .  Medication management  EDUCATION AND COUNSELING:  -I personally discussed the following with the patient:   Risks/benefits/side effects/alternatives of medication including but not limited to drowsiness, sedation, dizziness, increased risk for falls and cardiovascular effects (hypotension, cardiac arrhythmias, death)., Diagnosis, prognosis, and treatment options discussed with patient at length.   and Medical reasoning as above      The patient communicates understanding of the above and agrees that due to the complexity of his/her  diagnosis, results of any testing and further recommendations will typically be discussed/made during a face to face encounter in my office. The patient and/or family further understands it is their responsibility to keep proper follow up.     This dictation was created using voice recognition software. I have made every reasonable attempt to avoid dictation errors, but this document may contain an error not identified before finalizing. If the error changes the accuracy of the document, I would appreciate it being brought to my attention. Thank you.    Face to face start time: 15:50  Face to face end time: 16:25  Total time:  42min  Additional non-face to face time was spent: reviewing diagnostic workup to date, ordering medications, tests, or procedures and documenting clinical information in EMR      Chela Barr MD  Neurology  [facility]

## 2021-01-12 NOTE — TELEPHONE ENCOUNTER
----- Message from Chela Barr M.D. sent at 1/12/2021  8:04 AM PST -----  Can you please call in Zomig to her pharmacy per patient request

## 2021-01-12 NOTE — TELEPHONE ENCOUNTER
I called [facility] and got a voice mail for the medical records office left a message saying am trying to call in a medication and to let me know if that is possible. I spoke with the pt and she said the clinic wouldn't take verbal orders. She said she is taking the prescription to the clinic.

## 2021-01-13 RX ORDER — SUMATRIPTAN 100 MG/1
100 TABLET, FILM COATED ORAL
Qty: 10 TAB | Refills: 3 | Status: SHIPPED | OUTPATIENT
Start: 2021-01-13

## 2021-01-13 NOTE — TELEPHONE ENCOUNTER
I called the [facility] pharmacy and called in the sumatriptan (IMITREX) 100 MG tablet  Take 1/2-1 tablet po at onset of headache, may repeat dose in 2 hours if unrelieved. Do not exceed more than 2 tablets in 24 hours., Disp-9 Tab, R-5, Normal

## 2021-02-11 ENCOUNTER — HOSPITAL ENCOUNTER (OUTPATIENT)
Dept: RADIOLOGY | Facility: MEDICAL CENTER | Age: 41
End: 2021-02-11
Attending: PHYSICIAN ASSISTANT
Payer: MEDICAID

## 2021-02-11 DIAGNOSIS — M54.16 LUMBAR RADICULOPATHY: ICD-10-CM

## 2021-02-11 PROCEDURE — 72148 MRI LUMBAR SPINE W/O DYE: CPT

## 2021-11-15 ENCOUNTER — HOSPITAL ENCOUNTER (OUTPATIENT)
Dept: RADIOLOGY | Facility: MEDICAL CENTER | Age: 41
End: 2021-11-15
Attending: PHYSICIAN ASSISTANT
Payer: MEDICAID

## 2021-11-15 DIAGNOSIS — M54.17 LUMBOSACRAL NEURITIS: ICD-10-CM

## 2021-11-15 PROCEDURE — 72148 MRI LUMBAR SPINE W/O DYE: CPT

## 2023-02-27 ENCOUNTER — OCCUPATIONAL MEDICINE (OUTPATIENT)
Dept: URGENT CARE | Facility: PHYSICIAN GROUP | Age: 43
End: 2023-02-27
Payer: COMMERCIAL

## 2023-02-27 ENCOUNTER — APPOINTMENT (OUTPATIENT)
Dept: RADIOLOGY | Facility: IMAGING CENTER | Age: 43
End: 2023-02-27
Attending: NURSE PRACTITIONER
Payer: COMMERCIAL

## 2023-02-27 VITALS
TEMPERATURE: 96.7 F | OXYGEN SATURATION: 94 % | BODY MASS INDEX: 44.41 KG/M2 | DIASTOLIC BLOOD PRESSURE: 66 MMHG | HEART RATE: 87 BPM | SYSTOLIC BLOOD PRESSURE: 118 MMHG | WEIGHT: 293 LBS | RESPIRATION RATE: 18 BRPM | HEIGHT: 68 IN

## 2023-02-27 DIAGNOSIS — M25.532 LEFT WRIST PAIN: ICD-10-CM
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
## 2023-02-27 PROCEDURE — 73110 X-RAY EXAM OF WRIST: CPT | Mod: TC,FY,LT | Performed by: RADIOLOGY

## 2023-02-27 PROCEDURE — 99213 OFFICE O/P EST LOW 20 MIN: CPT | Performed by: NURSE PRACTITIONER

## 2023-02-27 ASSESSMENT — PAIN SCALES - GENERAL: PAINLEVEL: 10=SEVERE PAIN

## 2023-02-27 NOTE — LETTER
"   [facility] [place]  [address] [place] [address] C[place]ST[place] Oliveira [address]  Phone:  [phone] - Fax:  [phone]   [facility] Progress Report and Disability Certification  Date of Service: 2/27/2023   No Show:  No  Date / Time of Next Visit: 3/6/2023   Claim Information   Patient Name: Froy Acosta  Claim Number:     Employer:   [employer] Date of Injury: 2/27/2023     Insurer / TPA: [email or web address]  ID / SSN:     Occupation:   Diagnosis: Diagnoses of Left wrist pain and Work related injury were pertinent to this visit.    Medical Information   Related to Industrial Injury? 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 She states she noted immediate onset of pain and could not move her wrist. She is tearful in the clinic today about the pain level.  She states that she can move her fingers somewhat but is limited by pain.  She cannot flex or extend her wrist without pain, nor can she rotate it.  She denies any numbness or tingling of her wrist.    Objective Findings: Physical Exam  Vitals reviewed.   Constitutional:       Appearance: Normal appearance.   HENT:      Head: Normocephalic and atraumatic.      Nose: Nose normal.      Mouth/Throat:      Mouth: Mucous membranes are moist.      Pharynx: Oropharynx is clear.   Eyes:      Extraocular Movements: Extraocular movements intact.      Conjunctiva/sclera: Conjunctivae normal.      Pupils: Pupils are equal, round, and reactive to light.   Cardiovascular:      Rate and Rhythm: Normal rate and regular rhythm.   Pulmonary:      Effort: Pulmonary effort is normal.      Breath sounds: Normal breath sounds.   Abdominal:      General: Abdomen is flat.      Palpations: Abdomen is soft.   Musculoskeletal:      Left " wrist: Tenderness and bony tenderness present. No snuff box tenderness. Decreased range of motion.      Cervical back: Normal range of motion and neck supple.      Comments: Tenderness to palpation over the lateral aspect of the wrist.  No swelling noted.  No deformity palpable. Limited ROM due to pain. Patient tearful during exam.  Capillary refill less than two seconds but when testing capillary refill of the left pinky finger, this caused worsening pain to the patient with shooting pain down the lateral aspect of the wrist. Neurovascular status intact.    Skin:     General: Skin is warm and dry.      Capillary Refill: Capillary refill takes less than 2 seconds.   Neurological:      General: No focal deficit present.      Mental Status: She is alert.   Psychiatric:         Mood and Affect: Mood normal.         Behavior: Behavior normal.        Pre-Existing Condition(s):     Assessment:   Initial Visit    Status: Additional Care Required  Permanent Disability:No    Plan:      Diagnostics: X-ray    Comments:    RADIOLOGY RESULTS  DX-WRIST-COMPLETE 3+ LEFT    Result Date: 2023 2:55 PM HISTORY/REASON FOR EXAM:  Left wrist pain after left wrist injury TECHNIQUE/EXAM DESCRIPTION AND NUMBER OF VIEWS: 4 views of the LEFT wrist. COMPARISON:  No comparison available FINDINGS:  Bone mineralization is normal.  There is no evidence of fracture or dislocation.  Soft tissues are normal.     No evidence of fracture or dislocation.              Disability Information   Status: Released to Restricted Duty    From:  2023  Through: 3/6/2023 Restrictions are: Temporary   Physical Restrictions   Sitting:    Standing:    Stooping:    Bending:      Squatting:    Walking:    Climbing:    Pushin hrs/day   Pullin hrs/day Other:    Reaching Above Shoulder (L): 0 hrs/day Reaching Above Shoulder (R):       Reaching Below Shoulder (L):  0 hrs/day Reaching Below Shoulder (R):      Not to exceed Weight Limits    Carrying(hrs):   Comments:left upper extremity Weight Limit(lb): < or = to 10 pounds Lifting(hrs):   Comments:left upper extremity Weight  Limit(lb): < or = to 10 pounds   Comments: 1.  Left wrist pain  2.  Work related injury  Restrictions per D39  Wrist splint to left wrist, may come out of splint at home for gentle ROM  Ice, heat, elevation and antiinflammatories as needed for pain and swelling  Return in one week for reevaluation  Return sooner for worsening symptoms      Repetitive Actions   Hands: i.e. Fine Manipulations from Graspin hrs/day   Feet: i.e. Operating Foot Controls:     Driving / Operate Machinery:     Health Care Provider’s Original or Electronic Signature  TASH WynnePFredRFredN. Health Care Provider’s Original or Electronic Signature    Flex Acosta DO MPH     Clinic Name / Location: [address] [address] Clinic Phone Number: Dept: [phone]   Appointment Time: 2:30 Pm Visit Start Time: 2:36 PM   Check-In Time:  2:27 Pm Visit Discharge Time: 3:30 PM   Original-Treating Physician or Chiropractor    Page 2-Insurer/TPA    Page 3-Employer    Page 4-Employee

## 2023-02-27 NOTE — LETTER
"EMPLOYEE’S CLAIM FOR COMPENSATION/ REPORT OF INITIAL TREATMENT  FORM C-4    EMPLOYEE’S CLAIM - PROVIDE ALL INFORMATION REQUESTED   First Name  Froy Last Name  Karla Birthdate                    1980                Sex  female Claim Number (Insurer’s Use Only)   Home Address  [address]  Age  42 y.o. Height  1.727 m (5' 8\") Weight  330 LBS N     [facility] Zip  [address] Telephone  [phone] (home) [phone] X1 (work)   Mailing Address  [address]  [facility] Zip  [address] Primary Language Spoken  English    Insurer   Third-Party   [employer]   Employee's Occupation (Job Title) When Injury or Occupational Disease Occurred      Employer's Name/Company Name   [employer]   Telephone      Office Mail Address (Number and Street)[address]    [place] Zip   [address]   Date of Injury  2/27/2023               Hours Injury  1:15 PM Date Employer Notified  2/27/2023 Last Day of Work after Injury     or Occupational Disease  2/27/2023 Supervisor to Whom Injury     Reported  Ana Paredes   Address or Location of Accident (if applicable)  Work [1]   What were you doing at the time of accident? (if applicable)  Lifting a box of meat to put it away    How did this injury or occupational disease occur? (Be specific an answer in detail. Use additional sheet if necessary)  Lifting a box and the wrist made a \"pop\" noise   If you believe that you have an occupational disease, when did you first have knowledge of the disability and it relationship to your employment?  N/A Witnesses to the Accident  none      Nature of Injury or Occupational Disease  Sprain  Part(s) of Body Injured or Affected  Wrist (L) and Hand (L), N/A, N/A    I certify that the above is true and correct to the best of my knowledge and that I have provided this information in order to obtain the " benefits of [place]).  I hereby authorize any physician, chiropractor, surgeon, practitioner, or other person, any hospital, including [facility] or [facility], any medical service organization, any insurance company, or other institution or organization to release to each other, any medical or other information, including benefits paid or payable, pertinent to this injury or disease, except information relative to diagnosis, treatment and/or counseling for AIDS, psychological conditions, alcohol or controlled substances, for which I must give specific authorization.  A Photostat of this authorization shall be as valid as the original.     Date 02/27/2023   Place [facility] Employee’s Original or  *Electronic Signature   THIS REPORT MUST BE COMPLETED AND MAILED WITHIN 3 WORKING DAYS OF TREATMENT   Place  [facility]  Name of Facility  [place]   Date  2/27/2023 Diagnosis and Description of Injury or Occupational Disease  (M25.532) Left wrist pain  (Y99.0) Work related injury Is there evidence the injured employee was under the influence of alcohol and/or another controlled substance at the time of accident?  ? No ? Yes (if yes, please explain)   Hour  2:36 PM   Diagnoses of Left wrist pain and Work related injury were pertinent to this visit. No   Treatment  1.  Left wrist pain  2.  Work related injury  Restrictions per D39  Wrist splint to left wrist, may come out of splint at home for gentle ROM  Ice, heat, elevation and antiinflammatories as needed for pain and swelling  Return in one week for reevaluation  Return sooner for worsening symptoms  Have you advised the patient to remain off work five days or     more?    X-Ray Findings  Negative   ? Yes Indicate dates:   From   To      From information given by the employee, together with medical evidence, can        you directly  "connect this injury or occupational disease as job incurred?  Yes ? No If no, is the injured employee capable of:  ? full duty  No ? modified duty  Yes   Is additional medical care by a physician indicated?  Yes If Modified Duty, Specify any Limitations / Restrictions  Per D39   Do you know of any previous injury or disease contributing to this condition or occupational disease?  ? Yes ? No (Explain if yes)                          No   Date  2/27/2023 Print Health Care Provider's   MARGARET Wynne I certify the employer’s copy of  this form was mailed on:   Address  [address] Insurer’s Use Only     [facility] [facility] Zip  [address]    Provider’s Tax ID Number  [id number] Telephone  Dept: [phone]             Health Care Provider’s Original or Electronic Signature  e-KINA Donald Degree (MD,DO, DC,PAProsperC,APRN)        * Complete and attach Release of Information (Form C-4A) when injured employee signs C-4 Form electronically  ORIGINAL - TREATING HEALTHCARE PROVIDER PAGE 2 - INSURER/TPA PAGE 3 - EMPLOYER PAGE 4 - EMPLOYEE             Form C-4 (rev.08/21)           BRIEF DESCRIPTION OF RIGHTS AND BENEFITS  (Pursuant to [place]RS 616C.050)    Notice of Injury or Occupational Disease (Incident Report Form C-1): If an injury or occupational disease (OD) arises out of and in the course of employment, you must provide written notice to your employer as soon as practicable, but no later than 7 days after the accident or OD. Your employer shall maintain a sufficient supply of the required forms.    Claim for Compensation (Form C-4): If medical treatment is sought, the form C-4 is available at the place of initial treatment. A completed \"Claim for Compensation\" (Form C-4) must be filed within 90 days after an accident or OD. The treating physician or chiropractor must, within 3 working days after treatment, complete and mail to the employer, the employer's insurer and third-party " , the Claim for Compensation.    Medical Treatment: If you require medical treatment for your on-the-job injury or OD, you may be required to select a physician or chiropractor from a list provided by your workers’ compensation insurer, if it has contracted with an Organization for Managed Care (MCO) or Preferred Provider Organization (PPO) or providers of health care. If your employer has not entered into a contract with an MCO or PPO, you may select a physician or chiropractor from the Panel of Physicians and Chiropractors. Any medical costs related to your industrial injury or OD will be paid by your insurer.    Temporary Total Disability (TTD): If your doctor has certified that you are unable to work for a period of at least 5 consecutive days, or 5 cumulative days in a 20-day period, or places restrictions on you that your employer does not accommodate, you may be entitled to TTD compensation.    Temporary Partial Disability (TPD): If the wage you receive upon reemployment is less than the compensation for TTD to which you are entitled, the insurer may be required to pay you TPD compensation to make up the difference. TPD can only be paid for a maximum of 24 months.    Permanent Partial Disability (PPD): When your medical condition is stable and there is an indication of a PPD as a result of your injury or OD, within 30 days, your insurer must arrange for an evaluation by a rating physician or chiropractor to determine the degree of your PPD. The amount of your PPD award depends on the date of injury, the results of the PPD evaluation, your age and wage.    Permanent Total Disability (PTD): If you are medically certified by a treating physician or chiropractor as permanently and totally disabled and have been granted a PTD status by your insurer, you are entitled to receive monthly benefits not to exceed 66 2/3% of your average monthly wage. The amount of your PTD payments is subject to reduction  if you previously received a lump-sum PPD award.    Vocational Rehabilitation Services: You may be eligible for vocational rehabilitation services if you are unable to return to the job due to a permanent physical impairment or permanent restrictions as a result of your injury or occupational disease.    Transportation and Per Estefanía Reimbursement: You may be eligible for travel expenses and per estefanía associated with medical treatment.    Reopening: You may be able to reopen your claim if your condition worsens after claim closure.     Appeal Process: If you disagree with a written determination issued by the insurer or the insurer does not respond to your request, you may appeal to the Department of Administration, , by following the instructions contained in your determination letter. You must appeal the determination within 70 days from the date of the determination letter at [address], [address], [place], [place] [address], or [address], [address], [place] [address]. If you disagree with the  decision, you may appeal to the Department of Administration, . You must file your appeal within 30 days from the date of the  decision letter at [address], [address], [place], [place] [address], or [address], [address], [place], [place] [address]. If you disagree with a decision of an , you may file a petition for judicial review with the District Court. You must do so within 30 days of the Appeal Officer’s decision. You may be represented by an  at your own expense or you may contact the [facility] for possible representation.    [place]  for Injured Workers (NAIW): If you disagree with a  decision, you may request that NAIW represent you without charge at an  Hearing. For information regarding denial of benefits, you may contact the [facility] at: [address], [address], [place] [address], [phone], or [address], [address], [place] [address], [phone]    To File a Complaint with the Division: If you wish to file a complaint with the  of the Division of Industrial Relations (DIR),  please contact the Workers’ Compensation Section, [address], [address], [place], [place] [address], telephone [phone], or [address], [address], [place], [place] [address], telephone [phone].    For assistance with Workers’ Compensation Issues: You may contact the [facility] Office for Consumer Health Assistance, [address], [address] [address] [facility] [address], [place] [address], Toll Free 1-436.664.8713, Web site: http://[facility].nv.[facility]/Programs/SMOOTH E-mail: [email or web address]              __________________________________________________________________                                  02/27/2023            Employee Name / Signature                                                                                                                            Date                                                                                                                                                                                                                              D-2 (rev. 10/20)

## 2023-02-27 NOTE — PROGRESS NOTES
"Subjective:     Froy Acosta is a 42 y.o. 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 She states she noted immediate onset of pain and could not move her wrist. She is tearful in the clinic today about the pain level.  She states that she can move her fingers somewhat but is limited by pain.  She cannot flex or extend her wrist without pain, nor can she rotate it.  She denies any numbness or tingling of her wrist.     PMH:   No pertinent past medical history to this problem  MEDS:  Medications were reviewed in EMR  ALLERGIES:  Allergies were reviewed in EMR  SOCHX:  Works as a   FH:   No pertinent family history to this problem       Objective:     /66   Pulse 87   Temp 35.9 °C (96.7 °F) (Temporal)   Resp 18   Ht 1.727 m (5' 8\")   Wt (!) 150 kg (330 lb)   SpO2 94%   BMI 50.18 kg/m²     Physical Exam  Vitals reviewed.   Constitutional:       Appearance: Normal appearance.   HENT:      Head: Normocephalic and atraumatic.      Nose: Nose normal.      Mouth/Throat:      Mouth: Mucous membranes are moist.      Pharynx: Oropharynx is clear.   Eyes:      Extraocular Movements: Extraocular movements intact.      Conjunctiva/sclera: Conjunctivae normal.      Pupils: Pupils are equal, round, and reactive to light.   Cardiovascular:      Rate and Rhythm: Normal rate and regular rhythm.   Pulmonary:      Effort: Pulmonary effort is normal.      Breath sounds: Normal breath sounds.   Abdominal:      General: Abdomen is flat.      Palpations: Abdomen is soft.   Musculoskeletal:      Left wrist: Tenderness and bony tenderness present. No snuff box tenderness. Decreased range of motion.      Cervical back: Normal " range of motion and neck supple.      Comments: Tenderness to palpation over the lateral aspect of the wrist.  No swelling noted.  No deformity palpable. Limited ROM due to pain. Patient tearful during exam.  Capillary refill less than two seconds but when testing capillary refill of the left pinky finger, this caused worsening pain to the patient with shooting pain down the lateral aspect of the wrist. Neurovascular status intact.    Skin:     General: Skin is warm and dry.      Capillary Refill: Capillary refill takes less than 2 seconds.   Neurological:      General: No focal deficit present.      Mental Status: She is alert.   Psychiatric:         Mood and Affect: Mood normal.         Behavior: Behavior normal.         RADIOLOGY RESULTS   DX-WRIST-COMPLETE 3+ LEFT    Result Date: 2/27/2023 2/27/2023 2:55 PM HISTORY/REASON FOR EXAM:  Left wrist pain after left wrist injury TECHNIQUE/EXAM DESCRIPTION AND NUMBER OF VIEWS: 4 views of the LEFT wrist. COMPARISON:  No comparison available FINDINGS:  Bone mineralization is normal.  There is no evidence of fracture or dislocation.  Soft tissues are normal.     No evidence of fracture or dislocation.           Assessment/Plan:       1. Left wrist pain  - DX-WRIST-COMPLETE 3+ LEFT    2. Work related injury    Released to Restricted Duty FROM 2/27/2023 TO 3/6/2023  1.  Left wrist pain  2.  Work related injury  Restrictions per D39  Wrist splint to left wrist, may come out of splint at home for gentle ROM  Ice, heat, elevation and antiinflammatories as needed for pain and swelling  Return in one week for reevaluation  Return sooner for worsening symptoms      Differential diagnosis, natural history, supportive care, and indications for immediate follow-up discussed.

## 2023-03-13 ENCOUNTER — OCCUPATIONAL MEDICINE (OUTPATIENT)
Dept: OCCUPATIONAL MEDICINE | Facility: CLINIC | Age: 43
End: 2023-03-13
Payer: COMMERCIAL

## 2023-03-13 VITALS
DIASTOLIC BLOOD PRESSURE: 82 MMHG | SYSTOLIC BLOOD PRESSURE: 136 MMHG | RESPIRATION RATE: 20 BRPM | OXYGEN SATURATION: 97 % | HEIGHT: 68 IN | HEART RATE: 90 BPM | BODY MASS INDEX: 44.41 KG/M2 | WEIGHT: 293 LBS

## 2023-03-13 DIAGNOSIS — S69.92XD INJURY OF LEFT WRIST, SUBSEQUENT ENCOUNTER: ICD-10-CM

## 2023-03-13 PROCEDURE — 99213 OFFICE O/P EST LOW 20 MIN: CPT | Performed by: NURSE PRACTITIONER

## 2023-03-13 NOTE — LETTER
"[address],   [address] [place] [address]  Phone:  [phone] - Fax:  [phone]   Occupational Health [facility] Progress Report and Disability Certification  Date of Service: 3/13/2023   No Show:  No  Date / Time of Next Visit: 4/10/2023 @ 3:00 PM   Claim Information   Patient Name: Froy Acosta  Claim Number:     Employer:   [employer] Date of Injury: 2/27/2023     Insurer / TPA: [employer]  ID / SSN:     Occupation:   Diagnosis: The encounter diagnosis was Injury of left wrist, subsequent encounter.    Medical Information   Related to Industrial Injury? Yes    Subjective Complaints:  DOI:  2/27/2023. 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 Today symptoms have remained unchanged. Patient states that things are feeling worse.  She that she has no ability to lift anything because her hand. She has not been taking anything OTC. She is wearing her wrist splint and apply Icy-Hot which helps minimally. She has been tolerating light duty. MRI and Hand surgery referral placed due to severity of symptoms and mechanism of injury. Plan of care discussed with patient.    Objective Findings: Tenderness to palpation over the lateral aspect of the wrist.  No swelling noted.  No deformity palpable. Limited ROM due to pain. Patient tearful during exam.  Capillary refill less than two seconds but when testing capillary refill of the left pinky finger, this caused worsening pain to the patient with shooting pain down the lateral aspect of the wrist. Neurovascular status intact.     Pre-Existing Condition(s):     Assessment:   Condition Same    Status: Discharged / Care Transfer  Permanent Disability:No    Plan: Transfer CareDiagnostics    Diagnostics: MRI    Comments:  Follow-up in " 3 weeks, unless seen by Hand surgery   Work restrictions, per Hand surgery   Hand surgery referral placed, transfer care   MRI ordered   Continue with wearing brace while at work, wean at home as tolerated  MRI ordered   Continue rest, ice, and elevation  Recommend gnetle range of motion, stretching, and OTC topical Ointments i.e. Icy-Hot, Bio-freeze, or Tiger balm        Disability Information   Status: Released to Restricted Duty    From:  3/13/2023  Through: 4/10/2023 Restrictions are: Temporary   Physical Restrictions   Sitting:    Standing:    Stooping:    Bending:      Squatting:    Walking:    Climbin hrs/day Pushin hrs/day   Pullin hrs/day Other:    Reaching Above Shoulder (L): 0 hrs/day Reaching Above Shoulder (R):       Reaching Below Shoulder (L):    Reaching Below Shoulder (R):      Not to exceed Weight Limits   Carrying(hrs):   Weight Limit(lb): < or = to 10 pounds  Comments:left upper extremity Lifting(hrs):   Weight  Limit(lb): < or = to 10 pounds  Comments:left upper extremity   Comments:      Repetitive Actions   Hands: i.e. Fine Manipulations from [place] hrs/day  Comments:left upper extremity   Feet: i.e. Operating Foot Controls:     Driving / Operate Machinery:     Health Care Provider’s Original or Electronic Signature  MARGARET Castellano Health Care Provider’s Original or Electronic Signature    Flex Acosta DO MPH     Clinic Name / Location: [address],   [address]  [place] [address] Clinic Phone Number: Dept: [phone]   Appointment Time: 11:15 Am Visit Start Time: 11:25 AM   Check-In Time:  11:04 Am Visit Discharge Time:  11:48 AM   Original-Treating Physician or Chiropractor    Page 2-Insurer/TPA    Page 3-Employer    Page 4-Employee

## 2023-03-13 NOTE — PROGRESS NOTES
"Subjective:     Froy Acosta is a 42 y.o. female who presents for Follow-Up (WC New2U DOI 2/2723 lt hand/wrist, rm 16)      DOI:  2/27/2023. 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 Today symptoms have remained unchanged. Patient states that things are feeling worse.  She that she has no ability to lift anything because her hand. She has not been taking anything OTC. She is wearing her wrist splint and apply Icy-Hot which helps minimally. She has been tolerating light duty. MRI and Hand surgery referral placed due to severity of symptoms and mechanism of injury. Plan of care discussed with patient.     ROS: All systems were reviewed on intake form, form was reviewed and signed. See scanned documents in media. Pertinent positives and negatives included in HPI.    PMH: No pertinent past medical history to this problem  MEDS: Medications were reviewed in Epic  ALLERGIES: No Known Allergies  SOCHX: Works as cook at [employer]  FH: No pertinent family history to this problem       Objective:     /82   Pulse 90   Resp 20   Ht 1.727 m (5' 8\")   Wt (!) 150 kg (330 lb)   SpO2 97%   BMI 50.18 kg/m²     [unfilled]    Tenderness to palpation over the lateral aspect of the wrist.  No swelling noted.  No deformity palpable. Limited ROM due to pain. Patient tearful during exam.  Capillary refill less than two seconds but when testing capillary refill of the left pinky finger, this caused worsening pain to the patient with shooting pain down the lateral aspect of the wrist. Neurovascular status intact.      Assessment/Plan:       1. Injury of left wrist, subsequent encounter  - MR-WRIST W/O LEFT; Future  - MR-HAND - W/O LEFT; Future  - Referral to Radiology  - Referral to Hand Surgery    Released to Restricted Duty FROM 3/13/2023 TO 4/10/2023   "   Follow-up in 3 weeks, unless seen by Hand surgery   Work restrictions, per Hand surgery   Hand surgery referral placed, transfer care   MRI ordered   Continue with wearing brace while at work, wean at home as tolerated  MRI ordered   Continue rest, ice, and elevation  Recommend gnetle range of motion, stretching, and OTC topical Ointments i.e. Icy-Hot, Bio-freeze, or Tiger balm        Differential diagnosis, natural history, supportive care, and indications for immediate follow-up discussed.    Approximately 25 minutes were spent in reviewing notes, preparing for visit, obtaining history, exam and evaluation, patient counseling/education and post visit documentation/orders.

## 2023-04-05 ENCOUNTER — APPOINTMENT (OUTPATIENT)
Dept: RADIOLOGY | Facility: MEDICAL CENTER | Age: 43
End: 2023-04-05
Attending: NURSE PRACTITIONER
Payer: OTHER GOVERNMENT

## 2023-04-10 ENCOUNTER — OCCUPATIONAL MEDICINE (OUTPATIENT)
Dept: OCCUPATIONAL MEDICINE | Facility: CLINIC | Age: 43
End: 2023-04-10
Payer: COMMERCIAL

## 2023-04-10 VITALS
BODY MASS INDEX: 44.41 KG/M2 | TEMPERATURE: 97.1 F | HEIGHT: 68 IN | HEART RATE: 85 BPM | DIASTOLIC BLOOD PRESSURE: 86 MMHG | SYSTOLIC BLOOD PRESSURE: 128 MMHG | WEIGHT: 293 LBS | OXYGEN SATURATION: 95 %

## 2023-04-10 DIAGNOSIS — S69.92XD INJURY OF LEFT WRIST, SUBSEQUENT ENCOUNTER: ICD-10-CM

## 2023-04-10 PROCEDURE — 99213 OFFICE O/P EST LOW 20 MIN: CPT | Performed by: NURSE PRACTITIONER

## 2023-04-10 ASSESSMENT — ENCOUNTER SYMPTOMS
MYALGIAS: 1
CARDIOVASCULAR NEGATIVE: 1
SENSORY CHANGE: 0
PSYCHIATRIC NEGATIVE: 1
WEAKNESS: 1
TINGLING: 0
ROS SKIN COMMENTS: SWELLING
CONSTITUTIONAL NEGATIVE: 1
RESPIRATORY NEGATIVE: 1

## 2023-04-10 NOTE — PROGRESS NOTES
"Subjective:     Froy Acosta is a 42 y.o. female who presents for Other (WC DOI 2/27/23 left hand injury, feeling worse room 17)      DOI:  2/27/2023. 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 Today symptoms have remained unchanged. She has no ability to lift anything because her hand. She has not been taking anything OTC. She is wearing her wrist splint and apply Icy-Hot which helps minimally. She has been tolerating light duty. MRI scheduled for 4/15/23. and surgery has not been scheduled as she is awaiting the MRI results.  Plan of care discussed with patient.     Review of Systems   Constitutional: Negative.    Respiratory: Negative.     Cardiovascular: Negative.    Musculoskeletal:  Positive for joint pain and myalgias.   Skin:         Swelling   Neurological:  Positive for weakness. Negative for tingling and sensory change.   Psychiatric/Behavioral: Negative.       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 Appears well-developed and well-nourished.   Cardiovascular: Normal rate.    Pulmonary/Chest: Effort normal. No respiratory distress.   Neurological: Patient is alert and oriented to person, place, and time.   Skin: Skin is warm and dry.   Psychiatric: Normal mood and affect. Behavior is normal.     Left wrist: Tenderness to palpation over the lateral aspect of the wrist.  No swelling noted.  No deformity palpable. Limited ROM due to pain. Patient tearful during exam.  Capillary refill less than two seconds but when testing capillary refill of the left pinky finger, this caused worsening pain to the " patient with shooting pain down the lateral aspect of the wrist. Neurovascular status intact.  Patient is tearful during exam.    Assessment/Plan:       1. Injury of left wrist, subsequent encounter    Released to Restricted Duty FROM 4/10/2023 TO 4/19/2023       Follow-up in 1 week, unless seen by Hand surgery   Work restrictions, per Hand surgery   Hand surgery referral placed, transfer care   MRI scheduled 4/15/23  Continue with wearing brace while at work, wean at home as tolerated  Continue rest, ice, and elevation  Recommend gnetle range of motion, stretching, and OTC topical Ointments i.e. Icy-Hot, Bio-freeze, or Tiger balm    Differential diagnosis, natural history, supportive care, and indications for immediate follow-up discussed.    Approximately 25 minutes was spent in preparing for visit, obtaining history, exam and evaluation, patient counseling/education and post visit documentation/orders.

## 2023-04-10 NOTE — LETTER
"   [address],   [address] [address]  Phone:  [phone] - Fax:  [phone]   Occupational Health [facility] Progress Report and Disability Certification  Date of Service: 4/10/2023   No Show:  No  Date / Time of Next Visit: 4/19/2023@1:3 PM   Claim Information   Patient Name: Froy Acosta  Claim Number:     Employer:   [employer] Date of Injury: 2/27/2023     Insurer / TPA: [employer]  ID / SSN:     Occupation:   Diagnosis: The encounter diagnosis was Injury of left wrist, subsequent encounter.    Medical Information   Related to Industrial Injury? Yes    Subjective Complaints:  DOI:  2/27/2023. 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 Today symptoms have remained unchanged. She that she has no ability to lift anything because her hand. She has not been taking anything OTC. She is wearing her wrist splint and apply Icy-Hot which helps minimally. She has been tolerating light duty. MRI scheduled for 4/15/23. and surgery has not been scheduled as she is awaiting the MRI results.  Plan of care discussed with patient.    Objective Findings: Left wrist: Tenderness to palpation over the lateral aspect of the wrist.  No swelling noted.  No deformity palpable. Limited ROM due to pain. Patient tearful during exam.  Capillary refill less than two seconds but when testing capillary refill of the left pinky finger, this caused worsening pain to the patient with shooting pain down the lateral aspect of the wrist. Neurovascular status intact.  Patient is tearful during exam.   Pre-Existing Condition(s):     Assessment:   Condition Same    Status: Discharged / Care Transfer  Permanent Disability:No    Plan: DiagnosticsTransfer Care    Diagnostics: MRI    Comments:  Follow-up " in 1 week, unless seen by Hand surgery   Work restrictions, per Hand surgery   Hand surgery referral placed, transfer care   MRI scheduled 4/15/23  Continue with wearing brace while at work, wean at home as tolerated  Continue rest, ice, and elevation  Recommend gnetle range of motion, stretching, and OTC topical Ointments i.e. Icy-Hot, Bio-freeze, or Tiger balm    Disability Information   Status: Released to Restricted Duty    From:  4/10/2023  Through: 2023 Restrictions are: Temporary   Physical Restrictions   Sitting:    Standing:    Stooping:    Bending:      Squatting:    Walking:    Climbin hrs/day Pushin hrs/day   Pullin hrs/day Other:    Reaching Above Shoulder (L): < or = to 1 hr/day Reaching Above Shoulder (R):       Reaching Below Shoulder (L):  < or = to 1 hrs/day Reaching Below Shoulder (R):      Not to exceed Weight Limits   Carrying(hrs):   Weight Limit(lb): < or = to 10 pounds  Comments:left upper extremity Lifting(hrs):   Weight  Limit(lb): < or = to 10 pounds  Comments:left upper extremity   Comments:      Repetitive Actions   Hands: i.e. Fine Manipulations from [place] hrs/day   Feet: i.e. Operating Foot Controls:     Driving / Operate Machinery:     Health Care Provider’s Original or Electronic Signature  MARGARET Castellano Health Care Provider’s Original or Electronic Signature    Flex Acosta DO MPH     Clinic Name / Location: [address],   [address] [place], [place] [address] [place] [address] Clinic Phone Number: Dept: [phone]   Appointment Time: 3:00 Pm Visit Start Time: 3:07 PM   Check-In Time:  2:54 Pm Visit Discharge Time:  3:39 PM   Original-Treating Physician or Chiropractor    Page 2-Insurer/TPA    Page 3-Employer    Page 4-Employee

## 2023-04-19 ENCOUNTER — OCCUPATIONAL MEDICINE (OUTPATIENT)
Dept: OCCUPATIONAL MEDICINE | Facility: CLINIC | Age: 43
End: 2023-04-19
Payer: COMMERCIAL

## 2023-04-19 VITALS
OXYGEN SATURATION: 95 % | TEMPERATURE: 95.8 F | WEIGHT: 293 LBS | SYSTOLIC BLOOD PRESSURE: 122 MMHG | HEIGHT: 67 IN | DIASTOLIC BLOOD PRESSURE: 84 MMHG | BODY MASS INDEX: 45.99 KG/M2 | HEART RATE: 88 BPM

## 2023-04-19 DIAGNOSIS — S69.92XD INJURY OF LEFT WRIST, SUBSEQUENT ENCOUNTER: ICD-10-CM

## 2023-04-19 PROCEDURE — 99213 OFFICE O/P EST LOW 20 MIN: CPT | Performed by: NURSE PRACTITIONER

## 2023-04-19 ASSESSMENT — ENCOUNTER SYMPTOMS
MYALGIAS: 1
RESPIRATORY NEGATIVE: 1
WEAKNESS: 1
TINGLING: 0
CARDIOVASCULAR NEGATIVE: 1
PSYCHIATRIC NEGATIVE: 1
CONSTITUTIONAL NEGATIVE: 1
SENSORY CHANGE: 0
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
## 2023-04-19 NOTE — LETTER
"   [address],   [address] [address]  Phone:  [phone] - Fax:  [phone]   Occupational Health [facility] Progress Report and Disability Certification  Date of Service: 4/19/2023   No Show:  No  Date / Time of Next Visit: 5/18/2023@9:00AM   Claim Information   Patient Name: Froy Acosta  Claim Number:     Employer:   [employer] Date of Injury: 2/27/2023     Insurer / TPA: [employer]  ID / SSN:     Occupation:   Diagnosis: The encounter diagnosis was Injury of left wrist, subsequent encounter.    Medical Information   Related to Industrial Injury? Yes    Subjective Complaints:  DOI:  2/27/2023. 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 Today symptoms have remained unchanged. She has no ability to lift anything because her hand. She has not been taking anything OTC.  Continued use of the wrist splint and application of Icy-Hot which helps minimally. She has been tolerating light duty. MRI results reviewed with patient. Hand surgery was approved, pending scheduling with Dr. Barnes.  Plan of care discussed with patient.    Objective Findings: Left wrist: Tenderness to palpation over the lateral aspect of the wrist.  No swelling noted.  No deformity palpable. Limited ROM due to pain. Patient tearful during exam.  Capillary refill less than two seconds but when testing capillary refill of the left pinky finger, this caused worsening pain to the patient with shooting pain down the lateral aspect of the wrist. Neurovascular status intact.  Patient is tearful during exam.    MRI Left Wrist 4/14/23:   Impression:  1. Partial-thickness tearing of the triangular fibrocartilage, as described above with surrounding edema.  2. No acute bony abnormality.  3. Mild extensor carpi " ulnaris tendinopathy.  4. Possible small ganglion cyst between the bases of the 3rd and 4th metacarpals.   Pre-Existing Condition(s):     Assessment:   Condition Same    Status: Discharged / Care Transfer  Permanent Disability:No    Plan: Transfer Care    Diagnostics:      Comments:  Follow-up in 4 weeks, unless seen by Hand surgery   Work restrictions, per Hand surgery   Hand surgery referral placed, transfer care   Continue with wearing brace while at work, wean at home as tolerated  Continue rest, ice, and elevation  Recommend gnetle range of motion, stretching, and OTC topical Ointments i.e. Icy-Hot, Bio-freeze, or Tiger balm    Disability Information   Status: Released to Restricted Duty    From:  2023  Through: 2023 Restrictions are: Temporary   Physical Restrictions   Sitting:    Standing:    Stooping:    Bending:      Squatting:    Walking:    Climbin hrs/day Pushin hrs/day   Pullin hrs/day Other:    Reaching Above Shoulder (L): < or = to 1 hr/day Reaching Above Shoulder (R):       Reaching Below Shoulder (L):  < or = to 1 hrs/day Reaching Below Shoulder (R):      Not to exceed Weight Limits   Carrying(hrs):   Weight Limit(lb): < or = to 10 pounds  Comments:left upper extremity Lifting(hrs):   Weight  Limit(lb): < or = to 10 pounds  Comments:left upper extremity   Comments:      Repetitive Actions   Hands: i.e. Fine Manipulations from [place] hrs/day  Comments:left upper extremity   Feet: i.e. Operating Foot Controls:     Driving / Operate Machinery:     Health Care Provider’s Original or Electronic Signature  MARGARET Castellano Health Care Provider’s Original or Electronic Signature    Flex Acosta DO MPH     Clinic Name / Location: [address],   [address]  [place] [address] Clinic Phone Number: Dept: [phone]   Appointment Time: 1:30 Pm Visit Start Time: 1:27 PM   Check-In Time:  1:19 Pm Visit Discharge Time:  2:03 PM    Original-Treating Physician or Chiropractor    Page 2-Insurer/TPA    Page 3-Employer    Page 4-Employee

## 2023-05-16 ENCOUNTER — HOSPITAL ENCOUNTER (EMERGENCY)
Facility: MEDICAL CENTER | Age: 43
End: 2023-05-16
Attending: EMERGENCY MEDICINE
Payer: OTHER GOVERNMENT

## 2023-05-16 ENCOUNTER — APPOINTMENT (OUTPATIENT)
Dept: RADIOLOGY | Facility: MEDICAL CENTER | Age: 43
End: 2023-05-16
Attending: EMERGENCY MEDICINE
Payer: OTHER GOVERNMENT

## 2023-05-16 VITALS
RESPIRATION RATE: 16 BRPM | HEART RATE: 80 BPM | SYSTOLIC BLOOD PRESSURE: 148 MMHG | BODY MASS INDEX: 52.62 KG/M2 | WEIGHT: 293 LBS | TEMPERATURE: 97 F | DIASTOLIC BLOOD PRESSURE: 88 MMHG | OXYGEN SATURATION: 95 %

## 2023-05-16 DIAGNOSIS — S16.1XXA STRAIN OF NECK MUSCLE, INITIAL ENCOUNTER: ICD-10-CM

## 2023-05-16 PROCEDURE — A9270 NON-COVERED ITEM OR SERVICE: HCPCS | Mod: UD | Performed by: EMERGENCY MEDICINE

## 2023-05-16 PROCEDURE — 72040 X-RAY EXAM NECK SPINE 2-3 VW: CPT

## 2023-05-16 PROCEDURE — 700102 HCHG RX REV CODE 250 W/ 637 OVERRIDE(OP): Mod: UD | Performed by: EMERGENCY MEDICINE

## 2023-05-16 PROCEDURE — 99284 EMERGENCY DEPT VISIT MOD MDM: CPT

## 2023-05-16 RX ORDER — IBUPROFEN 600 MG/1
600 TABLET ORAL ONCE
Status: COMPLETED | OUTPATIENT
Start: 2023-05-16 | End: 2023-05-16

## 2023-05-16 RX ADMIN — IBUPROFEN 600 MG: 600 TABLET, FILM COATED ORAL at 16:09

## 2023-05-16 ASSESSMENT — LIFESTYLE VARIABLES
DO YOU DRINK ALCOHOL: NO
DOES PATIENT WANT TO STOP DRINKING: NO

## 2023-05-16 NOTE — ED NOTES
Cervical collar applied to patient without incident. CMS intact before and after cervical collar application.
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
## 2023-05-18 ENCOUNTER — OFFICE VISIT (OUTPATIENT)
Dept: URGENT CARE | Facility: PHYSICIAN GROUP | Age: 43
End: 2023-05-18
Payer: OTHER GOVERNMENT

## 2023-05-18 VITALS
TEMPERATURE: 98 F | OXYGEN SATURATION: 98 % | DIASTOLIC BLOOD PRESSURE: 80 MMHG | SYSTOLIC BLOOD PRESSURE: 134 MMHG | RESPIRATION RATE: 18 BRPM | HEIGHT: 67 IN | BODY MASS INDEX: 45.99 KG/M2 | WEIGHT: 293 LBS | HEART RATE: 80 BPM

## 2023-05-18 DIAGNOSIS — M25.511 ACUTE PAIN OF BOTH SHOULDERS: ICD-10-CM
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
## 2023-05-18 DIAGNOSIS — S16.1XXA ACUTE STRAIN OF NECK MUSCLE, INITIAL ENCOUNTER: ICD-10-CM

## 2023-05-18 DIAGNOSIS — M25.512 ACUTE PAIN OF BOTH SHOULDERS: ICD-10-CM

## 2023-05-18 PROCEDURE — 3079F DIAST BP 80-89 MM HG: CPT | Performed by: NURSE PRACTITIONER

## 2023-05-18 PROCEDURE — 3075F SYST BP GE 130 - 139MM HG: CPT | Performed by: NURSE PRACTITIONER

## 2023-05-18 PROCEDURE — 99213 OFFICE O/P EST LOW 20 MIN: CPT | Performed by: NURSE PRACTITIONER

## 2023-05-18 RX ORDER — METHOCARBAMOL 500 MG/1
1000 TABLET, FILM COATED ORAL 4 TIMES DAILY
Qty: 120 TABLET | Refills: 0 | Status: SHIPPED | OUTPATIENT
Start: 2023-05-18 | End: 2023-05-28

## 2023-05-18 ASSESSMENT — ENCOUNTER SYMPTOMS: NECK PAIN: 1
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
## 2023-05-18 NOTE — LETTER
May 18, 2023    To Whom It May Concern:         This is confirmation that Froy Acosta attended her scheduled appointment with MARGARET Tinajero on 5/18/23.  Please excuse her absence due to an acute injury.  She may return to work on 5/22/2023 or sooner if better.         If you have any questions please do not hesitate to call me at the phone number listed below.    Sincerely,          LAWRENCE Tinajero.  [phone]

## 2023-09-21 ENCOUNTER — APPOINTMENT (OUTPATIENT)
Dept: RADIOLOGY | Facility: MEDICAL CENTER | Age: 43
End: 2023-09-21
Attending: NURSE PRACTITIONER
Payer: OTHER GOVERNMENT

## 2023-10-04 ENCOUNTER — HOSPITAL ENCOUNTER (OUTPATIENT)
Dept: RADIOLOGY | Facility: MEDICAL CENTER | Age: 43
End: 2023-10-04
Attending: NURSE PRACTITIONER
Payer: OTHER GOVERNMENT

## 2023-10-04 DIAGNOSIS — M54.17 LUMBOSACRAL RADICULOPATHY: ICD-10-CM

## 2023-10-04 PROCEDURE — 72148 MRI LUMBAR SPINE W/O DYE: CPT

## 2023-10-06 ENCOUNTER — HOSPITAL ENCOUNTER (OUTPATIENT)
Dept: RADIOLOGY | Facility: MEDICAL CENTER | Age: 43
End: 2023-10-06
Attending: NURSE PRACTITIONER
Payer: OTHER GOVERNMENT

## 2023-10-06 DIAGNOSIS — M54.17 RADICULOPATHY, LUMBOSACRAL REGION: ICD-10-CM

## 2023-10-06 PROCEDURE — 72110 X-RAY EXAM L-2 SPINE 4/>VWS: CPT

## 2023-10-13 ENCOUNTER — HOSPITAL ENCOUNTER (OUTPATIENT)
Dept: RADIOLOGY | Facility: MEDICAL CENTER | Age: 43
End: 2023-10-13
Attending: PHYSICIAN ASSISTANT
Payer: COMMERCIAL

## 2023-10-13 DIAGNOSIS — S69.80XA OTHER SPECIFIED INJURIES OF UNSPECIFIED WRIST, HAND AND FINGER(S), INITIAL ENCOUNTER: ICD-10-CM

## 2023-10-13 PROCEDURE — 25246 INJECTION FOR WRIST X-RAY: CPT | Mod: RT

## 2023-10-13 PROCEDURE — 700117 HCHG RX CONTRAST REV CODE 255: Performed by: PHYSICIAN ASSISTANT

## 2023-10-13 PROCEDURE — A9579 GAD-BASE MR CONTRAST NOS,1ML: HCPCS | Performed by: PHYSICIAN ASSISTANT

## 2023-10-13 PROCEDURE — 73222 MRI JOINT UPR EXTREM W/DYE: CPT | Mod: LT

## 2023-10-13 RX ADMIN — IOHEXOL 10 ML: 300 INJECTION, SOLUTION INTRAVENOUS at 11:45

## 2023-10-13 RX ADMIN — GADOTERIDOL 0.1 ML: 279.3 INJECTION, SOLUTION INTRAVENOUS at 11:45

## 2023-10-13 NOTE — PROGRESS NOTES
Pt presents to [facility] Pt was consented by MD at bedside, confirmed by this RN and consent at bedside. Pt transferred to [facility] Procedure Room in high vazquez's position in a chair with L arm resting on procedure table. Patient underwent a Left Wrist arthrogram by Dr. Goldstein. Procedure site was marked by MD and verified using imaging guidance. Pt did not require medications or sedation. VS were not done during procedure.  Pt ambulated to [facility] with RN. Pt tolerated procedure well.

## 2023-11-14 ENCOUNTER — ANESTHESIA EVENT (OUTPATIENT)
Dept: SURGERY | Facility: MEDICAL CENTER | Age: 43
End: 2023-11-14
Payer: COMMERCIAL

## 2023-11-14 ENCOUNTER — PRE-ADMISSION TESTING (OUTPATIENT)
Dept: ADMISSIONS | Facility: MEDICAL CENTER | Age: 43
End: 2023-11-14
Attending: ORTHOPAEDIC SURGERY
Payer: COMMERCIAL

## 2023-11-14 RX ORDER — EMPAGLIFLOZIN 25 MG/1
25 TABLET, FILM COATED ORAL
COMMUNITY

## 2023-11-15 ENCOUNTER — HOSPITAL ENCOUNTER (OUTPATIENT)
Facility: MEDICAL CENTER | Age: 43
End: 2023-11-15
Attending: ORTHOPAEDIC SURGERY | Admitting: ORTHOPAEDIC SURGERY
Payer: COMMERCIAL

## 2023-11-15 ENCOUNTER — ANESTHESIA (OUTPATIENT)
Dept: SURGERY | Facility: MEDICAL CENTER | Age: 43
End: 2023-11-15
Payer: COMMERCIAL

## 2023-11-15 ENCOUNTER — APPOINTMENT (OUTPATIENT)
Dept: RADIOLOGY | Facility: MEDICAL CENTER | Age: 43
End: 2023-11-15
Attending: ORTHOPAEDIC SURGERY
Payer: COMMERCIAL

## 2023-11-15 VITALS
BODY MASS INDEX: 44.41 KG/M2 | OXYGEN SATURATION: 97 % | TEMPERATURE: 97.9 F | HEIGHT: 68 IN | SYSTOLIC BLOOD PRESSURE: 152 MMHG | WEIGHT: 293 LBS | HEART RATE: 87 BPM | RESPIRATION RATE: 16 BRPM | DIASTOLIC BLOOD PRESSURE: 89 MMHG

## 2023-11-15 DIAGNOSIS — S63.592A COMPLEX TEAR OF TRIANGULAR FIBROCARTILAGE OF LEFT WRIST, INITIAL ENCOUNTER: ICD-10-CM

## 2023-11-15 LAB
ANION GAP SERPL CALC-SCNC: 13 MMOL/L (ref 7–16)
BUN SERPL-MCNC: 8 MG/DL (ref 8–22)
CALCIUM SERPL-MCNC: 8.6 MG/DL (ref 8.4–10.2)
CHLORIDE SERPL-SCNC: 101 MMOL/L (ref 96–112)
CO2 SERPL-SCNC: 23 MMOL/L (ref 20–33)
CREAT SERPL-MCNC: 0.51 MG/DL (ref 0.5–1.4)
EKG IMPRESSION: NORMAL
GFR SERPLBLD CREATININE-BSD FMLA CKD-EPI: 118 ML/MIN/1.73 M 2
GLUCOSE SERPL-MCNC: 166 MG/DL (ref 65–99)
HCG UR QL: NEGATIVE
POTASSIUM SERPL-SCNC: 4.1 MMOL/L (ref 3.6–5.5)
SODIUM SERPL-SCNC: 137 MMOL/L (ref 135–145)

## 2023-11-15 PROCEDURE — 700105 HCHG RX REV CODE 258: Performed by: ORTHOPAEDIC SURGERY

## 2023-11-15 PROCEDURE — 160036 HCHG PACU - EA ADDL 30 MINS PHASE I: Performed by: ORTHOPAEDIC SURGERY

## 2023-11-15 PROCEDURE — 700102 HCHG RX REV CODE 250 W/ 637 OVERRIDE(OP)

## 2023-11-15 PROCEDURE — 160029 HCHG SURGERY MINUTES - 1ST 30 MINS LEVEL 4: Performed by: ORTHOPAEDIC SURGERY

## 2023-11-15 PROCEDURE — 502240 HCHG MISC OR SUPPLY RC 0272: Performed by: ORTHOPAEDIC SURGERY

## 2023-11-15 PROCEDURE — 160025 RECOVERY II MINUTES (STATS): Performed by: ORTHOPAEDIC SURGERY

## 2023-11-15 PROCEDURE — 160035 HCHG PACU - 1ST 60 MINS PHASE I: Performed by: ORTHOPAEDIC SURGERY

## 2023-11-15 PROCEDURE — 700101 HCHG RX REV CODE 250: Performed by: ORTHOPAEDIC SURGERY

## 2023-11-15 PROCEDURE — A9270 NON-COVERED ITEM OR SERVICE: HCPCS | Performed by: ANESTHESIOLOGY

## 2023-11-15 PROCEDURE — 93010 ELECTROCARDIOGRAM REPORT: CPT | Performed by: INTERNAL MEDICINE

## 2023-11-15 PROCEDURE — 700111 HCHG RX REV CODE 636 W/ 250 OVERRIDE (IP): Performed by: ANESTHESIOLOGY

## 2023-11-15 PROCEDURE — C1713 ANCHOR/SCREW BN/BN,TIS/BN: HCPCS | Performed by: ORTHOPAEDIC SURGERY

## 2023-11-15 PROCEDURE — 160009 HCHG ANES TIME/MIN: Performed by: ORTHOPAEDIC SURGERY

## 2023-11-15 PROCEDURE — 160002 HCHG RECOVERY MINUTES (STAT): Performed by: ORTHOPAEDIC SURGERY

## 2023-11-15 PROCEDURE — 700101 HCHG RX REV CODE 250: Performed by: ANESTHESIOLOGY

## 2023-11-15 PROCEDURE — A9270 NON-COVERED ITEM OR SERVICE: HCPCS

## 2023-11-15 PROCEDURE — 81025 URINE PREGNANCY TEST: CPT

## 2023-11-15 PROCEDURE — 160041 HCHG SURGERY MINUTES - EA ADDL 1 MIN LEVEL 4: Performed by: ORTHOPAEDIC SURGERY

## 2023-11-15 PROCEDURE — 160048 HCHG OR STATISTICAL LEVEL 1-5: Performed by: ORTHOPAEDIC SURGERY

## 2023-11-15 PROCEDURE — 36415 COLL VENOUS BLD VENIPUNCTURE: CPT

## 2023-11-15 PROCEDURE — 700102 HCHG RX REV CODE 250 W/ 637 OVERRIDE(OP): Performed by: ANESTHESIOLOGY

## 2023-11-15 PROCEDURE — 160046 HCHG PACU - 1ST 60 MINS PHASE II: Performed by: ORTHOPAEDIC SURGERY

## 2023-11-15 PROCEDURE — 80048 BASIC METABOLIC PNL TOTAL CA: CPT

## 2023-11-15 PROCEDURE — 700111 HCHG RX REV CODE 636 W/ 250 OVERRIDE (IP): Mod: JZ | Performed by: ANESTHESIOLOGY

## 2023-11-15 PROCEDURE — 93005 ELECTROCARDIOGRAM TRACING: CPT | Performed by: ANESTHESIOLOGY

## 2023-11-15 PROCEDURE — 160047 HCHG PACU  - EA ADDL 30 MINS PHASE II: Performed by: ORTHOPAEDIC SURGERY

## 2023-11-15 DEVICE — ANCHOR SWIVELOCK SL 3.5 X 8.5MM **MUST ORDER INCREMENTS OF 5 EACH**: Type: IMPLANTABLE DEVICE | Site: WRIST | Status: FUNCTIONAL

## 2023-11-15 RX ORDER — OXYCODONE HCL 5 MG/5 ML
10 SOLUTION, ORAL ORAL
Status: COMPLETED | OUTPATIENT
Start: 2023-11-15 | End: 2023-11-15

## 2023-11-15 RX ORDER — HYDROMORPHONE HYDROCHLORIDE 2 MG/ML
INJECTION, SOLUTION INTRAMUSCULAR; INTRAVENOUS; SUBCUTANEOUS PRN
Status: DISCONTINUED | OUTPATIENT
Start: 2023-11-15 | End: 2023-11-15 | Stop reason: SURG

## 2023-11-15 RX ORDER — HYDROMORPHONE HYDROCHLORIDE 1 MG/ML
0.2 INJECTION, SOLUTION INTRAMUSCULAR; INTRAVENOUS; SUBCUTANEOUS
Status: DISCONTINUED | OUTPATIENT
Start: 2023-11-15 | End: 2023-11-15 | Stop reason: HOSPADM

## 2023-11-15 RX ORDER — ONDANSETRON 2 MG/ML
INJECTION INTRAMUSCULAR; INTRAVENOUS PRN
Status: DISCONTINUED | OUTPATIENT
Start: 2023-11-15 | End: 2023-11-15 | Stop reason: SURG

## 2023-11-15 RX ORDER — ONDANSETRON 2 MG/ML
4 INJECTION INTRAMUSCULAR; INTRAVENOUS
Status: COMPLETED | OUTPATIENT
Start: 2023-11-15 | End: 2023-11-15

## 2023-11-15 RX ORDER — ALBUTEROL SULFATE 90 UG/1
AEROSOL, METERED RESPIRATORY (INHALATION) PRN
Status: DISCONTINUED | OUTPATIENT
Start: 2023-11-15 | End: 2023-11-15 | Stop reason: SURG

## 2023-11-15 RX ORDER — DEXAMETHASONE SODIUM PHOSPHATE 4 MG/ML
INJECTION, SOLUTION INTRA-ARTICULAR; INTRALESIONAL; INTRAMUSCULAR; INTRAVENOUS; SOFT TISSUE PRN
Status: DISCONTINUED | OUTPATIENT
Start: 2023-11-15 | End: 2023-11-15 | Stop reason: SURG

## 2023-11-15 RX ORDER — CEFAZOLIN SODIUM 1 G/3ML
INJECTION, POWDER, FOR SOLUTION INTRAMUSCULAR; INTRAVENOUS PRN
Status: DISCONTINUED | OUTPATIENT
Start: 2023-11-15 | End: 2023-11-15 | Stop reason: SURG

## 2023-11-15 RX ORDER — SODIUM CHLORIDE, SODIUM LACTATE, POTASSIUM CHLORIDE, CALCIUM CHLORIDE 600; 310; 30; 20 MG/100ML; MG/100ML; MG/100ML; MG/100ML
INJECTION, SOLUTION INTRAVENOUS CONTINUOUS
Status: ACTIVE | OUTPATIENT
Start: 2023-11-15 | End: 2023-11-15

## 2023-11-15 RX ORDER — OXYCODONE HCL 5 MG/5 ML
5 SOLUTION, ORAL ORAL
Status: COMPLETED | OUTPATIENT
Start: 2023-11-15 | End: 2023-11-15

## 2023-11-15 RX ORDER — SODIUM CHLORIDE, SODIUM LACTATE, POTASSIUM CHLORIDE, CALCIUM CHLORIDE 600; 310; 30; 20 MG/100ML; MG/100ML; MG/100ML; MG/100ML
INJECTION, SOLUTION INTRAVENOUS CONTINUOUS
Status: DISCONTINUED | OUTPATIENT
Start: 2023-11-15 | End: 2023-11-15 | Stop reason: HOSPADM

## 2023-11-15 RX ORDER — DIPHENHYDRAMINE HYDROCHLORIDE 50 MG/ML
12.5 INJECTION INTRAMUSCULAR; INTRAVENOUS
Status: DISCONTINUED | OUTPATIENT
Start: 2023-11-15 | End: 2023-11-15 | Stop reason: HOSPADM

## 2023-11-15 RX ORDER — HYDROMORPHONE HYDROCHLORIDE 1 MG/ML
0.1 INJECTION, SOLUTION INTRAMUSCULAR; INTRAVENOUS; SUBCUTANEOUS
Status: DISCONTINUED | OUTPATIENT
Start: 2023-11-15 | End: 2023-11-15 | Stop reason: HOSPADM

## 2023-11-15 RX ORDER — HYDROMORPHONE HYDROCHLORIDE 1 MG/ML
0.4 INJECTION, SOLUTION INTRAMUSCULAR; INTRAVENOUS; SUBCUTANEOUS
Status: DISCONTINUED | OUTPATIENT
Start: 2023-11-15 | End: 2023-11-15 | Stop reason: HOSPADM

## 2023-11-15 RX ORDER — BUPIVACAINE HYDROCHLORIDE AND EPINEPHRINE 5; 5 MG/ML; UG/ML
INJECTION, SOLUTION PERINEURAL
Status: DISCONTINUED | OUTPATIENT
Start: 2023-11-15 | End: 2023-11-15 | Stop reason: HOSPADM

## 2023-11-15 RX ORDER — ROCURONIUM BROMIDE 10 MG/ML
INJECTION, SOLUTION INTRAVENOUS PRN
Status: DISCONTINUED | OUTPATIENT
Start: 2023-11-15 | End: 2023-11-15 | Stop reason: SURG

## 2023-11-15 RX ORDER — LIDOCAINE HYDROCHLORIDE AND EPINEPHRINE 10; 10 MG/ML; UG/ML
INJECTION, SOLUTION INFILTRATION; PERINEURAL
Status: DISCONTINUED | OUTPATIENT
Start: 2023-11-15 | End: 2023-11-15 | Stop reason: HOSPADM

## 2023-11-15 RX ADMIN — HYDROCORTISONE SODIUM SUCCINATE 100 MG: 100 INJECTION, POWDER, FOR SOLUTION INTRAMUSCULAR; INTRAVENOUS at 11:16

## 2023-11-15 RX ADMIN — ONDANSETRON 4 MG: 2 INJECTION INTRAMUSCULAR; INTRAVENOUS at 13:25

## 2023-11-15 RX ADMIN — PROPOFOL 250 MG: 10 INJECTION, EMULSION INTRAVENOUS at 10:58

## 2023-11-15 RX ADMIN — ROCURONIUM BROMIDE 20 MG: 50 INJECTION, SOLUTION INTRAVENOUS at 11:42

## 2023-11-15 RX ADMIN — SODIUM CHLORIDE, POTASSIUM CHLORIDE, SODIUM LACTATE AND CALCIUM CHLORIDE: 600; 310; 30; 20 INJECTION, SOLUTION INTRAVENOUS at 10:51

## 2023-11-15 RX ADMIN — ALBUTEROL SULFATE 4 PUFF: 90 AEROSOL, METERED RESPIRATORY (INHALATION) at 11:09

## 2023-11-15 RX ADMIN — FENTANYL CITRATE 50 MCG: 50 INJECTION, SOLUTION INTRAMUSCULAR; INTRAVENOUS at 12:48

## 2023-11-15 RX ADMIN — CEFAZOLIN 3 G: 1 INJECTION, POWDER, FOR SOLUTION INTRAMUSCULAR; INTRAVENOUS at 11:04

## 2023-11-15 RX ADMIN — OXYCODONE HYDROCHLORIDE 10 MG: 5 SOLUTION ORAL at 12:47

## 2023-11-15 RX ADMIN — ROCURONIUM BROMIDE 70 MG: 50 INJECTION, SOLUTION INTRAVENOUS at 10:59

## 2023-11-15 RX ADMIN — FENTANYL CITRATE 100 MCG: 50 INJECTION, SOLUTION INTRAMUSCULAR; INTRAVENOUS at 10:53

## 2023-11-15 RX ADMIN — HYDROMORPHONE HYDROCHLORIDE 0.5 MG: 2 INJECTION INTRAMUSCULAR; INTRAVENOUS; SUBCUTANEOUS at 11:13

## 2023-11-15 RX ADMIN — DEXAMETHASONE SODIUM PHOSPHATE 4 MG: 4 INJECTION INTRA-ARTICULAR; INTRALESIONAL; INTRAMUSCULAR; INTRAVENOUS; SOFT TISSUE at 11:05

## 2023-11-15 RX ADMIN — ONDANSETRON 8 MG: 2 INJECTION INTRAMUSCULAR; INTRAVENOUS at 11:57

## 2023-11-15 RX ADMIN — FENTANYL CITRATE 50 MCG: 50 INJECTION, SOLUTION INTRAMUSCULAR; INTRAVENOUS at 12:56

## 2023-11-15 RX ADMIN — SUGAMMADEX 200 MG: 100 INJECTION, SOLUTION INTRAVENOUS at 12:13

## 2023-11-15 ASSESSMENT — PAIN DESCRIPTION - PAIN TYPE
TYPE: SURGICAL PAIN

## 2023-11-15 ASSESSMENT — PAIN SCALES - GENERAL: PAIN_LEVEL: 0

## 2023-11-15 NOTE — ANESTHESIA POSTPROCEDURE EVALUATION
Patient: Froy Acosta    Procedure Summary       Date: 11/15/23 Room / Location:  OR  / SURGERY [facility]    Anesthesia Start: 1051 Anesthesia Stop: 1227    Procedure: LEFT WRIST DIAGNOSTIC ARTHROSCOPY WITH TRIANGULAR FIBROCARTILAGE COMPLEX REPAIR AND POSSIBLE DEBRIDEMENT (Left: Wrist) Diagnosis: (TEAR OF TRIANGULAR FIBROCARTILAGE COMPLEX OF LEFT WRIST)    Surgeons: Skip Barnes M.D. Responsible Provider: Manish Bishop M.D.    Anesthesia Type: general ASA Status: 3            Final Anesthesia Type: general  Last vitals  BP   Blood Pressure: (!) 146/76    Temp   36 °C (96.8 °F)    Pulse   81   Resp   16    SpO2   95 %      Anesthesia Post Evaluation    Patient location during evaluation: PACU  Patient participation: complete - patient participated  Level of consciousness: awake and alert  Pain score: 0    Airway patency: patent  Anesthetic complications: no  Cardiovascular status: hemodynamically stable  Respiratory status: acceptable  Hydration status: euvolemic  Comments: Report given of breathing tx and yellow mucus. Appears stable    PONV: none          No notable events documented.     Nurse Pain Score: 6 (NPRS)

## 2023-11-15 NOTE — ANESTHESIA PROCEDURE NOTES
Airway    Date/Time: 11/15/2023 11:01 AM    Performed by: Manish Bishop M.D.  Authorized by: Manish Bishop M.D.    Location:  OR  Urgency:  Elective  Indications for Airway Management:  Anesthesia      Spontaneous Ventilation: absent    Sedation Level:  Deep  Preoxygenated: Yes    Patient Position:  Sniffing  Mask Difficulty Assessment:  1 - vent by mask  Final Airway Type:  Endotracheal airway  Final Endotracheal Airway:  ETT  Cuffed: Yes    Technique Used for Successful ETT Placement:  Video laryngoscopy    Insertion Site:  Oral  Blade Type:  Glide  Laryngoscope Blade/Videolaryngoscope Blade Size:  4  ETT Size (mm):  7.0  Measured from:  Teeth  ETT to Teeth (cm):  24  Placement Verified by: auscultation and capnometry    Cormack-Lehane Classification:  Grade I - full view of glottis  Number of Attempts at Approach:  1   Easy mask, ATET with grade I view, no teeth contact. Balloon visualized immediately distal to vocal cords. Soft bite block

## 2023-11-15 NOTE — OR NURSING
1430- Report received from Cuco JIMENEZ. Pt awake, family to bedside. O2 Dc'd, will monitor RA sat.  1438- Pt tolerating PO cookies and sprite.  1450- Pt up to bathroom.  1455- Pt maintaining RA sat above 90% without desats.  DC instructions given to pt and family.  All questions answered.  Pt given work note from Dr Barnes- copy in chart.    1502- Pt DC'd home via w/c to private vehicle, dressing cdi.

## 2023-11-15 NOTE — OP REPORT
Date of surgery: 11/15/2023    Preoperative diagnosis: Left wrist triangular fibrocartilage complex tear    Postoperative diagnosis: Same    Surgery performed:  1-left wrist diagnostic arthroscopy with arthroscopic triangular fibrocartilage complex repair  2-left wrist arthroscopy with debridement    Surgeon: Skip Barnes MD    First Assist: Shilpa Jeff CFA    Anesthesia: General    Complications: None    Estimated blood loss: 5 mL    Implants: Arthrex TFCC repair kit    Indication for procedure: Ms. Acosta is a pleasant lady who has sustained a left wrist injury which has resulted in persistent pain.  This has been recalcitrant to nonoperative treatments including rest, NSAIDs, activity modifications, physical therapy and steroid injections.  MRI findings are consistent with a TFCC tear and for these reasons the decision was made to take her to the operating today for the above-mentioned procedure.    Description of procedure:  Date of surgery Ms. Acosta was seen in the preoperative area where informed consent was obtained with all risks and benefits of the procedure explained and all questions answered.  She wished to proceed with the surgery.  The proper site was marked.  She was subsequently taken to the operating room and placed in the supine position with all bony promises well-padded.  General endotracheal anesthesia was induced.  A tourniquet was placed on the left upper extremities then prepped and draped in usual sterile fashion.    A timeout was performed with all persons in attendance agreeing on the proper patient, proper surgical site and proper surgery to be performed.    An Esmarch was used to exsanguinate the left upper extremity the tourniquet was insufflated to 250 mmHg.  He was then placed into an Arthrex Tri mono arm corado for a wrist arthroscopy.  I then made a standard 3-4 portal.  The arthroscope was placed into the 3-4 portal and a joint inspection was performed.  There is found  to be areas of synovitis throughout the dorsal aspect of the wrist joint.  There was an intact scapholunate ligament.  The articular cartilage looked in excellent condition.  There was found to be a tear of the TFCC at the foveal attachment.    I then made a 4-5 portal.  I also made a 6U portal.  I placed the arthroscope into the 4-5 portal for better visualization of the TFCC tear.  I then placed the shaver and the 6U portal and performed a debridement of the areas of synovitis as well as debriding the TFCC tear in preparation for direct repair.  I then placed a guidewire through the ulnar fovea and this was visualized in the joint space.  I then drilled over this guidewire after making an incision adjacent to the guidewire.  I then passed a FiberWire suture through the TFCC.  I then used a retrieval lasso to retrieve the suture to perform a horizontal mattress repair.  This was then retrieved and had a good reapproximation of the TFCC to the ulnar fovea.  I then used a Arthrex anchor to provide the correct tension for the direct repair.  This was placed into the ulna proximal to the guide hole which was previously drilled.    I then placed 2.062 K wire traversing the ulna and into the radius.  This was done with the hand in pronation to stabilize the DRUJ and allow for sufficient healing at the TFCC.  X-rays were obtained to verify acceptable positioning and alignment.  Pins were then bent and cut to length.  Pin caps were placed.  The wounds were then irrigated with normal saline and closed using 4-0 nylon in a horizontal mattress fashion.  They were then dressed with Xeroform, 4 x 4 and Tegaderm dressings.  The tourniquet was deflated.  General endotracheal anesthesia was reversed and she was taken the PACU in good and stable condition.    Disposition: She will be discharged home on a regular diet.  She will have a 2 pound lifting restriction the left upper extremity for activities of daily living.  She will be  no use of the left upper extremity and out of work for 6 weeks until the pins are removed.  At her 2-week postop we will remove sutures.  She will begin working with occupational therapy for wrist flexion and extension exercises and edema control.  We will not work on any pronosupination until 6 weeks postop when pins have been removed.  She was prescribed narcotic pain medication and instructed not to drive or operate machinery while taking this medication.

## 2023-11-15 NOTE — OR NURSING
1239 Report received from Gifty JIMENEZ. Patient care assumed.     1350 Pt met criteria for [place] to stage [place] with CNA assist. Pt states good pain control. Pt states nausea resolved. VSS. A&OX4. Coban dressing to LEFT hand CDI. Pink fingers, <3 sec cap refill, radial pulses covered. Ice pack sent with pt. Report to Tiny JIMENEZ.    Alejandra Daugherty

## 2023-11-15 NOTE — ANESTHESIA TIME REPORT
Anesthesia Start and Stop Event Times       Date Time Event    11/15/2023 1019 Ready for Procedure     1051 Anesthesia Start     1227 Anesthesia Stop          Responsible Staff  11/15/23      Name Role Begin End    Manish Bishop M.D. Anesth 1051 1227          Overtime Reason:  no overtime (within assigned shift)    Comments:

## 2023-11-15 NOTE — OR NURSING
Patient allergies and NPO status verified, home medication reconciliation completed and belongings secured. Patient verbalizes understanding of pain scale, expected course of stay and plan of care. Surgical site verified with patient. IV access established. Sequentials placed on legs.

## 2023-11-15 NOTE — DISCHARGE INSTRUCTIONS
If any questions arise, call your provider.  If your provider is not available, please feel free to call the Surgical Center at [phone].    MEDICATIONS: Resume taking daily medication.  Take prescribed pain medication with food.  If no medication is prescribed, you may take non-aspirin pain medication if needed.  PAIN MEDICATION CAN BE VERY CONSTIPATING.  Take a stool softener or laxative such as senokot, pericolace, or milk of magnesia if needed.    Last pain medication (Oxycodone 10 mg) given at 12:47    What to Expect Post Anesthesia    Rest and take it easy for the first 24 hours.  A responsible adult is recommended to remain with you during that time.  It is normal to feel sleepy.  We encourage you to not do anything that requires balance, judgment or coordination.    FOR 24 HOURS DO NOT:  Drive, operate machinery or run household appliances.  Drink beer or alcoholic beverages.  Make important decisions or sign legal documents.    To avoid nausea, slowly advance diet as tolerated, avoiding spicy or greasy foods for the first day.  Add more substantial food to your diet according to your provider's instructions.  INCREASE FLUIDS AND FIBER TO AVOID CONSTIPATION.    MILD FLU-LIKE SYMPTOMS ARE NORMAL.  YOU MAY EXPERIENCE GENERALIZED MUSCLE ACHES, THROAT IRRITATION, HEADACHE AND/OR SOME NAUSEA.    Keep dressings clean, dry and intact   No lifting anything heavier than 2 lbs with the operative hand   Keep hand elevated and apply ice as much as possible in the first three days   Do not operate a vehicle or machinery while taking narcotic pain medications   Return to clinic in 10-14 days   Please call the office with any questions [phone]

## 2023-11-15 NOTE — OR NURSING
1350: Patient arrived to phase II from PACU 1 via gurney. Report received from RN. Respirations are spontaneous and unlabored. VSS on RA. Dressing is CDI. LUE: cap refill less than 3 seconds, warm.    1428: family at bedside    1430: Report to aieme JIMENEZ

## 2023-11-15 NOTE — OR NURSING
1225 To PACU from OR by a santa pt sleeping, respirations spontaneous and nonlabored.     1239 pt care transferred to BARBARA Escobar.

## 2023-11-15 NOTE — ANESTHESIA PREPROCEDURE EVALUATION
Case: [id number] Date/Time: 11/15/23 1115    Procedure: LEFT WRIST DIAGNOSTIC ARTHROSCOPY WITH TRIANGULAR FIBROCARTILAGE COMPLEX REPAIR AND POSSIBLE DEBRIDEMENT    Pre-op diagnosis: TEAR OF TRIANGULAR FIBROCARTILAGE COMPLEX OF LEFT WRIST    Location:  OR 01 / SURGERY [facility]    Surgeons: Skip Barnes M.D.            Relevant Problems   ANESTHESIA   (positive) Complex sleep apnea syndrome       Physical Exam    Airway   Mallampati: II  TM distance: >3 FB  Neck ROM: full       Cardiovascular - normal exam  Rhythm: regular  Rate: normal  (-) murmur     Dental - normal exam           Pulmonary - normal exam  Breath sounds clear to auscultation     Abdominal    Neurological - normal exam                   Anesthesia Plan    ASA 3   ASA physical status 3 criteria: morbid obesity - BMI greater than or equal to 40    Plan - general       Airway plan will be ETT          Induction: intravenous    Postoperative Plan: Postoperative administration of opioids is intended.    Pertinent diagnostic labs and testing reviewed    Informed Consent:    Anesthetic plan and risks discussed with patient.    Use of blood products discussed with: patient whom consented to blood products.

## 2024-07-26 ENCOUNTER — HOSPITAL ENCOUNTER (OUTPATIENT)
Dept: RADIOLOGY | Facility: MEDICAL CENTER | Age: 44
End: 2024-07-26
Attending: NURSE PRACTITIONER
Payer: OTHER GOVERNMENT

## 2024-07-26 DIAGNOSIS — M25.562 LEFT KNEE PAIN, UNSPECIFIED CHRONICITY: ICD-10-CM

## 2024-07-26 PROCEDURE — 73562 X-RAY EXAM OF KNEE 3: CPT | Mod: LT

## 2024-11-04 ENCOUNTER — APPOINTMENT (OUTPATIENT)
Dept: RADIOLOGY | Facility: MEDICAL CENTER | Age: 44
End: 2024-11-04
Attending: STUDENT IN AN ORGANIZED HEALTH CARE EDUCATION/TRAINING PROGRAM
Payer: OTHER GOVERNMENT

## 2024-11-04 ENCOUNTER — HOSPITAL ENCOUNTER (EMERGENCY)
Facility: MEDICAL CENTER | Age: 44
End: 2024-11-04
Attending: STUDENT IN AN ORGANIZED HEALTH CARE EDUCATION/TRAINING PROGRAM
Payer: OTHER GOVERNMENT

## 2024-11-04 VITALS
SYSTOLIC BLOOD PRESSURE: 122 MMHG | RESPIRATION RATE: 21 BRPM | OXYGEN SATURATION: 94 % | DIASTOLIC BLOOD PRESSURE: 65 MMHG | HEART RATE: 76 BPM | TEMPERATURE: 97.4 F | WEIGHT: 293 LBS | BODY MASS INDEX: 45.99 KG/M2 | HEIGHT: 67 IN

## 2024-11-04 DIAGNOSIS — R07.9 CHEST PAIN, UNSPECIFIED TYPE: ICD-10-CM

## 2024-11-04 LAB
ALBUMIN SERPL BCP-MCNC: 3.6 G/DL (ref 3.2–4.9)
ALBUMIN/GLOB SERPL: 1.1 G/DL
ALP SERPL-CCNC: 137 U/L (ref 30–99)
ALT SERPL-CCNC: 14 U/L (ref 2–50)
ANION GAP SERPL CALC-SCNC: 12 MMOL/L (ref 7–16)
AST SERPL-CCNC: 19 U/L (ref 12–45)
BASOPHILS # BLD AUTO: 0.3 % (ref 0–1.8)
BASOPHILS # BLD: 0.03 K/UL (ref 0–0.12)
BILIRUB SERPL-MCNC: 0.2 MG/DL (ref 0.1–1.5)
BUN SERPL-MCNC: 11 MG/DL (ref 8–22)
CALCIUM ALBUM COR SERPL-MCNC: 9.2 MG/DL (ref 8.5–10.5)
CALCIUM SERPL-MCNC: 8.9 MG/DL (ref 8.5–10.5)
CHLORIDE SERPL-SCNC: 106 MMOL/L (ref 96–112)
CO2 SERPL-SCNC: 20 MMOL/L (ref 20–33)
CREAT SERPL-MCNC: 0.53 MG/DL (ref 0.5–1.4)
EOSINOPHIL # BLD AUTO: 0.26 K/UL (ref 0–0.51)
EOSINOPHIL NFR BLD: 2.9 % (ref 0–6.9)
ERYTHROCYTE [DISTWIDTH] IN BLOOD BY AUTOMATED COUNT: 48.1 FL (ref 35.9–50)
GFR SERPLBLD CREATININE-BSD FMLA CKD-EPI: 116 ML/MIN/1.73 M 2
GLOBULIN SER CALC-MCNC: 3.2 G/DL (ref 1.9–3.5)
GLUCOSE SERPL-MCNC: 150 MG/DL (ref 65–99)
HCG SERPL QL: NEGATIVE
HCT VFR BLD AUTO: 43.8 % (ref 37–47)
HGB BLD-MCNC: 15 G/DL (ref 12–16)
IMM GRANULOCYTES # BLD AUTO: 0.02 K/UL (ref 0–0.11)
IMM GRANULOCYTES NFR BLD AUTO: 0.2 % (ref 0–0.9)
LYMPHOCYTES # BLD AUTO: 3.12 K/UL (ref 1–4.8)
LYMPHOCYTES NFR BLD: 34.4 % (ref 22–41)
MCH RBC QN AUTO: 31 PG (ref 27–33)
MCHC RBC AUTO-ENTMCNC: 34.2 G/DL (ref 32.2–35.5)
MCV RBC AUTO: 90.5 FL (ref 81.4–97.8)
MONOCYTES # BLD AUTO: 0.59 K/UL (ref 0–0.85)
MONOCYTES NFR BLD AUTO: 6.5 % (ref 0–13.4)
NEUTROPHILS # BLD AUTO: 5.05 K/UL (ref 1.82–7.42)
NEUTROPHILS NFR BLD: 55.7 % (ref 44–72)
NRBC # BLD AUTO: 0 K/UL
NRBC BLD-RTO: 0 /100 WBC (ref 0–0.2)
NT-PROBNP SERPL IA-MCNC: <36 PG/ML (ref 0–125)
PLATELET # BLD AUTO: 218 K/UL (ref 164–446)
PMV BLD AUTO: 10.1 FL (ref 9–12.9)
POTASSIUM SERPL-SCNC: 3.9 MMOL/L (ref 3.6–5.5)
PROT SERPL-MCNC: 6.8 G/DL (ref 6–8.2)
RBC # BLD AUTO: 4.84 M/UL (ref 4.2–5.4)
SODIUM SERPL-SCNC: 138 MMOL/L (ref 135–145)
TROPONIN T SERPL-MCNC: <6 NG/L (ref 6–19)
WBC # BLD AUTO: 9.1 K/UL (ref 4.8–10.8)

## 2024-11-04 PROCEDURE — 84484 ASSAY OF TROPONIN QUANT: CPT

## 2024-11-04 PROCEDURE — 36415 COLL VENOUS BLD VENIPUNCTURE: CPT

## 2024-11-04 PROCEDURE — 93005 ELECTROCARDIOGRAM TRACING: CPT | Performed by: STUDENT IN AN ORGANIZED HEALTH CARE EDUCATION/TRAINING PROGRAM

## 2024-11-04 PROCEDURE — 83880 ASSAY OF NATRIURETIC PEPTIDE: CPT

## 2024-11-04 PROCEDURE — 99284 EMERGENCY DEPT VISIT MOD MDM: CPT

## 2024-11-04 PROCEDURE — 84703 CHORIONIC GONADOTROPIN ASSAY: CPT

## 2024-11-04 PROCEDURE — 85025 COMPLETE CBC W/AUTO DIFF WBC: CPT

## 2024-11-04 PROCEDURE — 80053 COMPREHEN METABOLIC PANEL: CPT

## 2024-11-04 PROCEDURE — 93005 ELECTROCARDIOGRAM TRACING: CPT

## 2024-11-04 PROCEDURE — 71045 X-RAY EXAM CHEST 1 VIEW: CPT

## 2024-11-04 NOTE — Clinical Note
Froy Acosta was seen and treated in our emergency department on 11/4/2024.  She may return to work on 11/09/2024.       If you have any questions or concerns, please don't hesitate to call.      Benito Arroyo

## 2024-11-05 LAB — EKG IMPRESSION: NORMAL

## 2024-11-05 NOTE — ED PROVIDER NOTES
ED Provider Note    CHIEF COMPLAINT  Chief Complaint   Patient presents with    Chest Pain     BIBA from home. Chest pain (10/10) that started mid-sternal that radiates to her back.       LIMITATION TO HISTORY   Select:     YESSICA Acosta is a 44 y.o. female who presents to the Emergency Department via EMS for evaluation of 10/10 sharp, mid-sternal to left sided chest pain that radiates to the back left onset 5 PM. The patient notes pain with palpation of the left side of the chest. Her pain is exacerbated with bending over. The patient states she was given a dose of ASA by EMS prior to arrival. The patient denies any cardiac history. The patient denies any known cardiac immediate family history, but notes a great uncle had heart issues. The patient takes atorvastatin and Jardiance.     OUTSIDE HISTORIAN(S):  Select:     EXTERNAL RECORDS REVIEWED  Select:     PAST MEDICAL HISTORY  Past Medical History:   Diagnosis Date    Back pain     lower back    Degenerative disc disease     Dental disorder     top tooth missing    Elevated cholesterol     history of per patient    Obesity     Sleep apnea     Wears CPAP    Snoring     Type II or unspecified type diabetes mellitus without mention of complication, not stated as uncontrolled     diet, oral agents     SURGICAL HISTORY  Past Surgical History:   Procedure Laterality Date    PB WRIST ARTHROSCOP,DIAGNOSTIC Left 11/15/2023    Procedure: LEFT WRIST DIAGNOSTIC ARTHROSCOPY WITH TRIANGULAR FIBROCARTILAGE COMPLEX REPAIR AND DEBRIDEMENT;  Surgeon: Skip Barnes M.D.;  Location: SURGERY [facility];  Service: Orthopedics    PB WRIST ARTHROSCOP,EXCIS TRIANG CART Right 6/5/2019    Procedure: REPAIR OR RECONSTRUCTION, TRIANGULAR FIBROCARTILAGE COMPLEX, WRIST, ARTHROSCOPIC;  Surgeon: Skip Barnes M.D.;  Location: [facility];  Service: Orthopedics    WRIST ARTHROSCOPY Right 6/5/2019    Procedure: ARTHROSCOPY, WRIST;  Surgeon:  Skip Barnes M.D.;  Location: SURGERY [facility];  Service: Orthopedics    ORTHOPEDIC OSTEOTOMY Right 6/5/2019    Procedure: OSTEOTOMY, ORTHOPEDIC - ULNAR SHORTENING;  Surgeon: Skip Barnes M.D.;  Location: SURGERY [facility];  Service: Orthopedics    ORIF, ANKLE Left 1/10/2019    Procedure: ANKLE ORIF- LATERAL MALLEOLUS WITH DELTOID REPAIR  ;  Surgeon: Wm Arian Ngo M.D.;  Location: SURGERY [facility];  Service: Orthopedics    YONATHAN BY LAPAROSCOPY  12/2001     FAMILY HISTORY  Family History   Problem Relation Age of Onset    Other Mother         lupus    Other Father         accident    Sleep Apnea Neg Hx       SOCIAL HISTORY  Social History     Socioeconomic History    Marital status: Single     Spouse name: Not on file    Number of children: Not on file    Years of education: Not on file    Highest education level: Not on file   Occupational History    Not on file   Tobacco Use    Smoking status: Former     Types: Cigars    Smokeless tobacco: Never    Tobacco comments:     smokes socially, one pack every two month; cigars once a month   Vaping Use    Vaping status: Never Used   Substance and Sexual Activity    Alcohol use: Not Currently    Drug use: No    Sexual activity: Not on file   Other Topics Concern    Not on file   Social History Narrative    Not on file     Social Drivers of Health     Financial Resource Strain: Not on file   Food Insecurity: Not on file   Transportation Needs: Not on file   Physical Activity: Not on file   Stress: Not on file   Social Connections: Not on file   Intimate Partner Violence: Not on file   Housing Stability: Not on file       CURRENT MEDICATIONS  No current facility-administered medications on file prior to encounter.     Current Outpatient Medications on File Prior to Encounter   Medication Sig Dispense Refill    Empagliflozin (JARDIANCE) 25 MG Tab Take 25 mg by mouth.      sumatriptan (IMITREX) 100 MG tablet Take 1 Tab by mouth one  "time as needed for Migraine for up to 1 dose. (Patient not taking: Reported on 2023) 10 Tab 3     ALLERGIES  No Known Allergies    PHYSICAL EXAM  VITAL SIGNS:/60   Pulse 80   Ht 1.702 m (5' 7\")   Wt (!) 159 kg (350 lb)   SpO2 92%   BMI 54.82 kg/m²       GENERAL: Awake and alert  HEAD: Normocephalic and atraumatic  NECK: Normal range of motion, without meningismus  EYES: Pupils Equal, Round, Reactive to Light, extraocular movements intact, conjunctiva white  ENT: Mucous membranes moist, oropharynx clear  PULMONARY: Normal effort, clear to auscultation  CARDIOVASCULAR: No murmurs, clicks or rubs, peripheral pulses 2+  THORAX: mild left chest wall tenderness  ABDOMINAL: Soft, non-tender, no guarding or rigidity present, no pulsatile masses  BACK: no midline tenderness, no costovertebral tenderness  NEUROLOGICAL: Grossly non-focal neurological examination, speech normal, gait normal  EXTREMITIES: No edema, normal to inspection  SKIN: Warm and dry.  PSYCHIATRIC: Affect is appropriate    DIAGNOSTIC STUDIES / PROCEDURES  EKG  Cardiac Monitor Interpretation:    Time: 209 PM  The cardiac monitor revealed normal sinus rhythm as interpreted by me.  The cardiac monitor was ordered secondary to the patient´s history of chest pain to monitor the patient for dysrhythmia    I have independently interpreted this EKG  Results for orders placed or performed during the hospital encounter of 24   EKG   Result Value Ref Range    Report       [facility] Emergency Dept.    Test Date:  2024  Pt Name:    MARSHA JACKSON              Department: ER  MRN:        [id number]                      Room:        04  Gender:     Female                       Technician: [id number]  :        1980                   Requested By:ER TRIAGE PROTOCOL  Order #:    [id number]                    Reading MD:    Measurements  Intervals                                Axis  Rate:       71                           " P:          2  ND:         192                          QRS:        -10  QRSD:       100                          T:          6  QT:         396  QTc:        431    Interpretive Statements  Sinus rhythm  Low voltage, precordial leads  Compared to ECG 11/15/2023 10:39:35  Low QRS voltage now present        LABS  Labs Reviewed   COMP METABOLIC PANEL - Abnormal; Notable for the following components:       Result Value    Glucose 150 (*)     Alkaline Phosphatase 137 (*)     All other components within normal limits   CBC WITH DIFFERENTIAL   PROBRAIN NATRIURETIC PEPTIDE, NT   TROPONIN   HCG QUAL SERUM   ESTIMATED GFR   TROPONIN     All labs reviewed by me.     RADIOLOGY  I have independently interpreted the diagnostic imaging associated with this visit and am waiting the final reading from the radiologist.   My preliminary interpretation is as follows: No pneumonia.     Formal Radiologist interpretation:  DX-CHEST-PORTABLE (1 VIEW)   Final Result         1.  No acute cardiopulmonary disease.        COURSE & MEDICAL DECISION MAKING    ED COURSE:    INTERVENTIONS BY ME:  Medications - No data to display    Response on recheck:    8:48 PM - Patient seen and examined at bedside. Discussed my plan for labs, imaging, and EKG with the patient. Patient verbalizes understanding and agreement to this plan of care.  Ordered HCG qual, proBNP, troponins, cmp, cbc with diff, DX-chest, and EKG to evaluate patient symptoms.     10:08 PM - The patient was reevaluated at bedside. The patient reports her pain has resolved. I then informed the patient of my plan for discharge, which includes strict return precautions for any new or worsening symptoms. Patient understands and verbalizes agreement to plan of care. Patient is comfortable going home at this time.      INITIAL ASSESSMENT, COURSE AND PLAN  Care Narrative:     This 44-year-old female presented with acute onset of severe (10/10) sharp mid-sternal chest pain radiating to her back,  which was exacerbated by bending over and tender to palpation over the left chest wall. Given the severity and nature of her symptoms, emergent causes of chest pain such as acute coronary syndrome (ACS), aortic dissection, and pulmonary embolism (PE) were considered. The patient has risk factors for cardiovascular disease, including obesity, hyperlipidemia, and diabetes, though she denied a personal or immediate family history of cardiac disease. An EKG showed sinus rhythm with low voltage in the precordial leads, but no acute ischemic changes. A chest X-ray was normal, and her labs, including troponin and proBNP, were pending at the time of the initial evaluation but ultimately returned normal.     Although ACS remains a concern in patients with her risk profile and presentation, the resolution of her pain, the benign workup including normal troponins and imaging, and the absence of other concerning symptoms such as hemodynamic instability, significant ECG changes, or signs of PE or aortic dissection make these diagnoses less likely at this time. Musculoskeletal pain, potentially related to her chest wall tenderness, is considered the most likely cause of her symptoms.    The patient was treated conservatively and discharged with instructions to follow up with her primary care physician for ongoing cardiovascular risk management, including control of her diabetes and hyperlipidemia. She was advised to return immediately if she experiences any recurrence of chest pain, shortness of breath, or other concerning symptoms. She was stable at the time of discharge.    HEART Score for Major Cardiac Events  HEART Score     History:  1  EC  Age:  0  Risk Factors:1    Troponin:  0    Heart Score:        Interpretation of HEART Score   Total Score   0-3 Points = Low Score, risk of MACE 0.9-1.7%.  4-6 Points = Moderate Score, risk of MACE 12-16.6%  7-10 Points = High Score, risk of MACE 50-65%    The patient was ruled out  for PE by PERC criteria:    AGE < 50  No estrogens, BCPs, recent surgery (4 weeks)  No hx of: DVT/PE or hemoptysis  No unilateral leg swelling  Pulse Ox > 94% and HR <100            ADDITIONAL PROBLEM LIST      DISPOSITION AND DISCUSSIONS  Discussion of management with other QHP or appropriate source(s):     I have discussed management of the patient with the following physicians and EDMUNDO's:      Escalation of care considered, and ultimately not performed:acute inpatient care management, however at this time, the patient is most appropriate for outpatient management      Decision tools and prescription drugs considered including, but not limited to:     The patient will return for new or worsening symptoms and is stable at the time of discharge.    The patient is referred to a primary physician for blood pressure management, diabetic screening, and for all other preventative health concerns.    DISPOSITION:  Patient will be discharged home in stable condition.    FOLLOW UP:  No follow-up provider specified.    OUTPATIENT MEDICATIONS:  Discharge Medication List as of 11/4/2024 10:14 PM         FINAL DIAGNOSIS  1. Chest pain, unspecified type        I, Agustin Still (Timoteo), am scribing for, and in the presence of, Benito Arroyo.    Electronically signed by: Agustin Still (Timoteo), 11/4/2024    IBenito personally performed the services described in this documentation, as scribed by Agustin Still in my presence, and it is both accurate and complete.     Electronically signed by: Benito Arroyo DO ,10:25 PM 11/04/24

## 2024-11-05 NOTE — ED TRIAGE NOTES
Chief Complaint   Patient presents with    Chest Pain     BIBA from home. Chest pain (10/10) that started mid-sternal that radiates to her back.

## 2024-11-12 ENCOUNTER — NON-PROVIDER VISIT (OUTPATIENT)
Dept: NEUROLOGY | Facility: MEDICAL CENTER | Age: 44
End: 2024-11-12
Attending: SPECIALIST
Payer: COMMERCIAL

## 2024-11-12 DIAGNOSIS — S52.202D UNSPECIFIED FRACTURE OF SHAFT OF LEFT ULNA, SUBSEQUENT ENCOUNTER FOR CLOSED FRACTURE WITH ROUTINE HEALING: ICD-10-CM

## 2024-11-12 PROCEDURE — 95908 NRV CNDJ TST 3-4 STUDIES: CPT | Performed by: SPECIALIST

## 2024-11-12 PROCEDURE — 95908 NRV CNDJ TST 3-4 STUDIES: CPT | Mod: 26 | Performed by: SPECIALIST

## 2024-11-12 PROCEDURE — 95886 MUSC TEST DONE W/N TEST COMP: CPT | Performed by: SPECIALIST

## 2024-11-12 PROCEDURE — 95886 MUSC TEST DONE W/N TEST COMP: CPT | Mod: 26 | Performed by: SPECIALIST

## 2024-11-12 NOTE — PROCEDURES
NERVE CONDUCTION STUDIES AND ELECTROMYOGRAPHY REPORT        11/12/24      Referring provider: Pcp Pt States None      SUMMARY OF PATIENT'S CLINICAL HISTORY,PHYSICAL EXAM, AND RATIONALE FOR TESTING:    Ms. Froy Acosta 44 y.o. presenting with history of a tear of the left TFCC and left ulnar fracture with continued pain in the left hand.    Past Medical History is significant for :   Past Medical History:   Diagnosis Date    Back pain     lower back    Degenerative disc disease     Dental disorder     top tooth missing    Elevated cholesterol     history of per patient    Obesity     Sleep apnea     Wears CPAP    Snoring     Type II or unspecified type diabetes mellitus without mention of complication, not stated as uncontrolled     diet, oral agents         The electrodiagnostic studies were performed to evaluate for possible peripheral neuropathy versus radiculopathy.      ELECTRODIAGNOSTIC EXAMINATION:  Nerve conduction studies (NCS) and electromyography (EMG) are utilized to evaluate direct or indirect damage to the peripheral nervous system. NCS are performed to measure the nerve(s) response(s) to electrostimulation across a given nerve segment. EMG evaluates the passive and active electrical activity of the muscle(s) in question.  Muscles are innervated by specific peripheral nerves and roots. Often times, several nerves the muscle to be examined in order to determine the presence or absence of the disease process. Furthermore, nerves and muscles may need to be tested in a [id number] comparison, as well as in additional extremities, as this may be crucial in characterizing the extent of the disease process, which may be diffuse or isolated and of varying degree of severity. The extent of the neurodiagnostic exam is justified as it may help arrive to a proper diagnosis, which ultimately may contribute to better management of the patient. Therefore, the nerves to muscles examined during the study were  medically necessary.    Unless otherwise noted, temperature of the extremity(s) study was monitored before and during the examination and remained between 32 and 36 degrees C for the upper extremities, and between 30 and 36 degrees C for the lower extremities.      NERVE CONDUCTION STUDIES:  Sensory nerves:   -Left median sensory nerve was examined.The response was within normal limits.  -Left ulnar sensory nerve was examined. The response was within normal limits.    Motor nerves:   -Left median motor nerve was examined. Recording electrodes placed at the Abductor Pollicis Brevis muscles. The response was within normal limits.  -Left ulnar motor nerve was examined. Recording electrodes placed at the Abductor Digiti Minimi muscles. The response was within normal limits.    No temporal dispersion or conduction block observed in any of the motor nerves examined.        ELECTROMYOGRAPHY:  The study was performed the concentric needle electrode. Fibrillation and fasciculation activity is graded by convention from none (0) to continuous (4+).  Needle electrode examination was performed in the following muscles: Left deltoid, biceps, triceps, first dorsal interosseous, abductor pollicis brevis.  The muscles tested demonstrated normal insertional activity, normal motor unit morphology and recruitment. There were no elements suggestive of active denervation.    Nerve Conduction Studies     Stim Site NR Onset (ms) Norm Onset (ms) O-P Amp (µV) Norm O-P Amp Site1 Site2 Delta-P (ms) Dist (cm) Osmin (m/s) Norm Osmin (m/s)   Left Median Anti Sensory (2nd Digit)   Wrist    2.8  36.7 >10 Wrist 2nd Digit 3.3 14.0 42 >39   Left Ulnar Anti Sensory (5th Digit)   Wrist    2.1  31.2 >15.0 Wrist 5th Digit 2.8 14.0 50 >38        Stim Site NR Onset (ms) Norm Onset (ms) O-P Amp (mV) Norm O-P Amp Site1 Site2 Delta-0 (ms) Dist (cm) Osmin (m/s) Norm Osmin (m/s)   Left Median Motor (Abd Poll Brev)   Wrist    3.5 <4.2 9.4 >5 Elbow Wrist 4.3 26.0 60 >50    Elbow    7.8  7.7          Left Ulnar Motor (Abd Dig Min)   Wrist    2.7 <4.2 7.8 >3 B Elbow Wrist 3.2 20.0 63 >53   B Elbow    5.9  7.9  A Elbow B Elbow 1.4 10.0 71 >53   A Elbow    7.3  7.9                        Electromyography     Side Muscle Nerve Root Ins Act Fibs Psw Amp Dur Poly Recrt Int Pat Comment   Left Deltoid Axillary C5-6 Nml Nml Nml Nml Nml 0 Nml Nml    Left Biceps Musculocut C5-6 Nml Nml Nml Nml Nml 0 Nml Nml    Left Triceps Radial C6-7-8 Nml Nml Nml Nml Nml 0 Nml Nml    Left 1stDorInt Ulnar C8-T1 Nml Nml Nml Nml Nml 0 Nml Nml    Left Abd Poll Brev Median C8-T1 Nml Nml Nml Nml Nml 0 Nml Nml              DIAGNOSTIC INTERPRETATION:   Extensive electrodiagnostic studies were performed to the left upper extremity.  The results are as follows:    1.  Normal EMG and nerve conduction studies of left upper extremity.  Specifically left median and ulnar motor and sensory conduction studies were within normal limits and there were no acute or chronic denervation changes in selected muscles studied in the left upper extremity.  There was no evidence of left upper extremity peripheral neuropathy, plexopathy or radiculopathy.  Clinical correlation is suggested.          MIKI Ayala M.D.  Procedures

## 2025-04-21 ENCOUNTER — HOSPITAL ENCOUNTER (OUTPATIENT)
Dept: RADIOLOGY | Facility: MEDICAL CENTER | Age: 45
End: 2025-04-21
Attending: NURSE PRACTITIONER
Payer: OTHER GOVERNMENT

## 2025-04-21 DIAGNOSIS — M54.50 LUMBAR PAIN: ICD-10-CM

## 2025-04-21 PROCEDURE — 72110 X-RAY EXAM L-2 SPINE 4/>VWS: CPT

## 2025-06-08 ENCOUNTER — OFFICE VISIT (OUTPATIENT)
Dept: URGENT CARE | Facility: PHYSICIAN GROUP | Age: 45
End: 2025-06-08
Payer: OTHER GOVERNMENT

## 2025-06-08 VITALS
HEIGHT: 67 IN | WEIGHT: 293 LBS | RESPIRATION RATE: 16 BRPM | HEART RATE: 80 BPM | TEMPERATURE: 97.5 F | SYSTOLIC BLOOD PRESSURE: 108 MMHG | BODY MASS INDEX: 45.99 KG/M2 | DIASTOLIC BLOOD PRESSURE: 86 MMHG | OXYGEN SATURATION: 94 %

## 2025-06-08 DIAGNOSIS — G43.911 INTRACTABLE MIGRAINE WITH STATUS MIGRAINOSUS, UNSPECIFIED MIGRAINE TYPE: Primary | ICD-10-CM

## 2025-06-08 DIAGNOSIS — R11.2 NAUSEA AND VOMITING, UNSPECIFIED VOMITING TYPE: ICD-10-CM

## 2025-06-08 PROCEDURE — 3079F DIAST BP 80-89 MM HG: CPT | Performed by: FAMILY MEDICINE

## 2025-06-08 PROCEDURE — 99214 OFFICE O/P EST MOD 30 MIN: CPT | Performed by: FAMILY MEDICINE

## 2025-06-08 PROCEDURE — 3074F SYST BP LT 130 MM HG: CPT | Performed by: FAMILY MEDICINE

## 2025-06-08 RX ORDER — ZOLMITRIPTAN 5 MG/1
TABLET, FILM COATED ORAL
COMMUNITY
End: 2025-06-08

## 2025-06-08 RX ORDER — ONDANSETRON 4 MG/1
TABLET, ORALLY DISINTEGRATING ORAL
Qty: 20 TABLET | Refills: 1 | Status: SHIPPED | OUTPATIENT
Start: 2025-06-08

## 2025-06-08 RX ORDER — SUMATRIPTAN SUCCINATE 100 MG/1
100 TABLET ORAL
Qty: 10 TABLET | Refills: 3 | Status: SHIPPED | OUTPATIENT
Start: 2025-06-08

## 2025-06-08 RX ORDER — TRAMADOL HYDROCHLORIDE 50 MG/1
TABLET ORAL
COMMUNITY
End: 2025-06-08

## 2025-06-08 RX ORDER — ONDANSETRON 4 MG/1
4 TABLET, ORALLY DISINTEGRATING ORAL ONCE
Status: COMPLETED | OUTPATIENT
Start: 2025-06-08 | End: 2025-06-08

## 2025-06-08 RX ORDER — SUMATRIPTAN SUCCINATE 25 MG/1
TABLET ORAL
COMMUNITY
End: 2025-06-08

## 2025-06-08 RX ORDER — SUMATRIPTAN SUCCINATE 100 MG/1
100 TABLET ORAL
Qty: 10 TABLET | Refills: 3 | Status: SHIPPED | OUTPATIENT
Start: 2025-06-08 | End: 2025-06-08 | Stop reason: SDUPTHER

## 2025-06-08 RX ORDER — ONDANSETRON 4 MG/1
TABLET, ORALLY DISINTEGRATING ORAL
Qty: 20 TABLET | Refills: 1 | Status: SHIPPED | OUTPATIENT
Start: 2025-06-08 | End: 2025-06-08 | Stop reason: SDUPTHER

## 2025-06-08 RX ORDER — KETOROLAC TROMETHAMINE 15 MG/ML
15 INJECTION, SOLUTION INTRAMUSCULAR; INTRAVENOUS ONCE
Status: COMPLETED | OUTPATIENT
Start: 2025-06-08 | End: 2025-06-08

## 2025-06-08 RX ADMIN — ONDANSETRON 4 MG: 4 TABLET, ORALLY DISINTEGRATING ORAL at 12:56

## 2025-06-08 RX ADMIN — KETOROLAC TROMETHAMINE 15 MG: 15 INJECTION, SOLUTION INTRAMUSCULAR; INTRAVENOUS at 12:56

## 2025-06-08 RX ADMIN — KETOROLAC TROMETHAMINE 15 MG: 15 INJECTION, SOLUTION INTRAMUSCULAR; INTRAVENOUS at 12:55

## 2025-06-08 ASSESSMENT — FIBROSIS 4 INDEX: FIB4 SCORE: 1.05

## 2025-06-08 NOTE — PROGRESS NOTES
Chief Complaint:    Chief Complaint   Patient presents with    Migraine    Emesis              History of Present Illness:    Long history of migraines, she is having her usual symptoms of migraine headache for the past 4 days. Has had some associated nausea and vomiting but can keep down oral fluids. Has light sensitivity. Previously had Sumatriptan 100 mg to take once a day as needed for migraine headache which helped, but she is out of this med. Toradol IM has been helpful for previous migraine headaches.      Past Medical History:    Past Medical History[1]    Past Surgical History:    Past Surgical History[2]    Social History:    Social History     Socioeconomic History    Marital status: Single     Spouse name: Not on file    Number of children: Not on file    Years of education: Not on file    Highest education level: Not on file   Occupational History    Not on file   Tobacco Use    Smoking status: Former     Types: Cigars    Smokeless tobacco: Never    Tobacco comments:     smokes socially, one pack every two month; cigars once a month   Vaping Use    Vaping status: Never Used   Substance and Sexual Activity    Alcohol use: Not Currently    Drug use: No    Sexual activity: Not on file   Other Topics Concern    Not on file   Social History Narrative    Not on file     Social Drivers of Health     Financial Resource Strain: Not on file   Food Insecurity: Not on file   Transportation Needs: Not on file   Physical Activity: Not on file   Stress: Not on file   Social Connections: Not on file   Intimate Partner Violence: Not on file   Housing Stability: Not on file     Family History:    Family History   Problem Relation Age of Onset    Other Mother         lupus    Other Father         accident    Sleep Apnea Neg Hx      Medications:    Medications Ordered Prior to Encounter[3]    Allergies:    Allergies[4]      Vitals:    Vitals:    06/08/25 1237   BP: 108/86   Pulse: 80   Resp: 16   Temp: 36.4 °C (97.5 °F)  "  TempSrc: Temporal   SpO2: 94%   Weight: (!) 159 kg (351 lb)   Height: 1.702 m (5' 7\")       Physical Exam:    Constitutional: Vital signs reviewed. Appears well-developed and well-nourished. Not feeling well and crying due to her migraine headache.   Eyes: Positive photophobia, prefers darkened room. PERRLA.  ENT: External ears normal. Hearing normal.   Neck: Neck supple.   Pulmonary/Chest: Respirations non-labored.   Musculoskeletal: Normal gait. No muscular atrophy or weakness.  Neurological: Alert and oriented to person, place, and time. CN 2-12 intact. Muscle tone normal. Coordination normal.   Skin: No rashes or lesions. Warm, dry, normal turgor.  Psychiatric: Judgment and thought content normal.       Assessment / Plan & Medical Decision Makin. Intractable migraine with status migrainosus, unspecified migraine type (Primary)  - ketorolac (Toradol) 15 MG/ML injection 15 mg  - ketorolac (Toradol) 15 MG/ML injection 15 mg  - sumatriptan (IMITREX) 100 MG tablet; Take 1 Tablet by mouth 1 time a day as needed for Migraine.  Dispense: 10 Tablet; Refill: 3    2. Nausea and vomiting, unspecified vomiting type  - ondansetron (Zofran ODT) dispertab 4 mg  - ondansetron (ZOFRAN ODT) 4 MG TABLET DISPERSIBLE; 1 tab, allow to disintegrate in mouth, every 6 hours only if needed for nausea or vomiting.  Dispense: 20 Tablet; Refill: 1        Work note given - excuse for 25.    Discussed with her DDX, management options, and risks, benefits, and alternatives to treatment plan agreed upon.    Long history of migraines, she is having her usual symptoms of migraine headache for the past 4 days. Has had some associated nausea and vomiting but can keep down oral fluids. Has light sensitivity. Previously had Sumatriptan 100 mg to take once a day as needed for migraine headache which helped, but she is out of this med. Toradol IM has been helpful for previous migraine headaches.    Not feeling well and crying due to her " migraine headache. Positive photophobia, prefers darkened room.    Migraine headaches - chronic condition with acute exacerbation.     Agreeable to medications given and prescribed.    Discussed expected course of duration, time for improvement, and to seek follow-up in Emergency Room, urgent care, or with PCP if getting worse at any time or not improving within expected time frame.          [1]   Past Medical History:  Diagnosis Date    Back pain     lower back    Degenerative disc disease     Dental disorder     top tooth missing    Elevated cholesterol     history of per patient    Obesity     Sleep apnea     Wears CPAP    Snoring     Type II or unspecified type diabetes mellitus without mention of complication, not stated as uncontrolled     diet, oral agents   [2]   Past Surgical History:  Procedure Laterality Date    PB WRIST ARTHROSCOP,DIAGNOSTIC Left 11/15/2023    Procedure: LEFT WRIST DIAGNOSTIC ARTHROSCOPY WITH TRIANGULAR FIBROCARTILAGE COMPLEX REPAIR AND DEBRIDEMENT;  Surgeon: Skip Barnes M.D.;  Location: [facility];  Service: Orthopedics    PB WRIST ARTHROSCOP,EXCIS TRIANG CART Right 6/5/2019    Procedure: REPAIR OR RECONSTRUCTION, TRIANGULAR FIBROCARTILAGE COMPLEX, WRIST, ARTHROSCOPIC;  Surgeon: Skip Barnes M.D.;  Location: [facility];  Service: Orthopedics    WRIST ARTHROSCOPY Right 6/5/2019    Procedure: ARTHROSCOPY, WRIST;  Surgeon: Skip Barnes M.D.;  Location: [facility];  Service: Orthopedics    ORTHOPEDIC OSTEOTOMY Right 6/5/2019    Procedure: OSTEOTOMY, ORTHOPEDIC - ULNAR SHORTENING;  Surgeon: Skip Barnes M.D.;  Location: [facility];  Service: Orthopedics    ORIF, ANKLE Left 1/10/2019    Procedure: ANKLE ORIF- LATERAL MALLEOLUS WITH DELTOID REPAIR  ;  Surgeon: Wm Arian Ngo M.D.;  Location: [facility];  Service: Orthopedics    YONATHAN BY LAPAROSCOPY  12/2001   [3]   No current outpatient  medications on file prior to visit.     No current facility-administered medications on file prior to visit.   [4] No Known Allergies

## 2025-06-08 NOTE — LETTER
June 8, 2025         Patient: Froy Acosta   YOB: 1980   Date of Visit: 6/8/2025           To Whom it May Concern:    Froy Acosta was seen in my clinic on 6/8/2025.     Please excuse from work for 6/8/25 due to medical condition.     If you have any questions or concerns, please don't hesitate to call.        Sincerely,           Arian Sierra M.D.  Electronically Signed

## 2025-07-03 ENCOUNTER — APPOINTMENT (OUTPATIENT)
Dept: RADIOLOGY | Facility: MEDICAL CENTER | Age: 45
End: 2025-07-03
Attending: NURSE PRACTITIONER
Payer: COMMERCIAL

## 2025-07-03 DIAGNOSIS — M54.16 RADICULOPATHY, LUMBAR REGION: ICD-10-CM

## 2025-07-03 PROCEDURE — 72148 MRI LUMBAR SPINE W/O DYE: CPT

## (undated) DEVICE — TUBE CONNECTING SUCTION - CLEAR PLASTIC STERILE 72 IN (50EA/CA)

## (undated) DEVICE — SENSOR SPO2 NEO LNCS ADHESIVE (20/BX) SEE USER NOTES

## (undated) DEVICE — SUCTION INSTRUMENT YANKAUER BULBOUS TIP W/O VENT (50EA/CA)

## (undated) DEVICE — KIT POSITIONING TRIMANO BEACH CHAIR (6EA/BX)

## (undated) DEVICE — KIT ROOM DECONTAMINATION

## (undated) DEVICE — Device

## (undated) DEVICE — WATER IRRIGATION STERILE 1000ML (12EA/CA)

## (undated) DEVICE — PROTECTOR ULNA NERVE - (36PR/CA)

## (undated) DEVICE — ELECTRODE 850 FOAM ADHESIVE - HYDROGEL RADIOTRNSPRNT (50/PK)

## (undated) DEVICE — TOWEL STOP TIMEOUT SAFETY FLAG (40EA/CA)

## (undated) DEVICE — BLADE SURGICAL #10 - (50/BX)

## (undated) DEVICE — GLOVE, LITE (PAIR)

## (undated) DEVICE — SUTURE GENERAL

## (undated) DEVICE — TUBING SMALL JOINT  5/BX

## (undated) DEVICE — SUTURE 4-0 ETHILON FS-1 18 (36PK/BX)"

## (undated) DEVICE — CHLORAPREP 26 ML APPLICATOR - ORANGE TINT(25/CA)

## (undated) DEVICE — SPLINT ORTH 15FTX3IN PD STKNT - (ORTHO-GLASS) OG-3L2 & OG-3L1 IS THE SAME PRODUCT.

## (undated) DEVICE — GOWN WARMING STANDARD FLEX - (30/CA)

## (undated) DEVICE — GLOVE BIOGEL SZ 8 SURGICAL PF LTX - (50PR/BX 4BX/CA)

## (undated) DEVICE — BAG, SPONGE COUNT 50600

## (undated) DEVICE — PACK UPPER EXTREMITY SM OR - (3/CA)

## (undated) DEVICE — CANISTER SUCTION RIGID RED 1500CC (40EA/CA)

## (undated) DEVICE — SODIUM CHL IRRIGATION 0.9% 1000ML (12EA/CA)

## (undated) DEVICE — MASK, LARYNGEAL AIRWAY #4

## (undated) DEVICE — DRAPE C-ARM LARGE 41IN X 74 IN - (10/BX 2BX/CA)

## (undated) DEVICE — PIN GUARD GREEN .62IN 1.6MM (2EA/PK 40PK/BX) MUST ORDER 2 BOX MINIMUM

## (undated) DEVICE — NEPTUNE 4 PORT MANIFOLD - (20/PK)

## (undated) DEVICE — CLOSURE WOUND 1/4 X 4 (STERI - STRIP) (50/BX 4BX/CA)

## (undated) DEVICE — ELECTRODE DUAL RETURN W/ CORD - (50/PK)

## (undated) DEVICE — MAT PATIENT POSITIONING PREVALON (10EA/CA)

## (undated) DEVICE — BLADE SURGICAL #11 - (50/BX)

## (undated) DEVICE — PADDING CAST 4 IN STERILE - 4 X 4 YDS (24/CA)

## (undated) DEVICE — SENSOR OXIMETER ADULT SPO2 RD SET (20EA/BX)

## (undated) DEVICE — HEAD HOLDER JUNIOR/ADULT

## (undated) DEVICE — DRAPE FLUOROSCAN C-ARM - 54 X 78 (40EA/CA)

## (undated) DEVICE — LACTATED RINGERS INJ 1000 ML - (14EA/CA 60CA/PF)

## (undated) DEVICE — MASK ANESTHESIA ADULT  - (100/CA)

## (undated) DEVICE — KNOT PUSHER ULTRA FAST FIX - SNEP

## (undated) DEVICE — GLOVE SZ 8 BIOGEL PI MICRO - PF LF (50PR/BX)

## (undated) DEVICE — CUFF TOURNIQUET 44 X 4 ONE PORT DISP - STERILE (10/CA)

## (undated) DEVICE — PACK LOWER EXTREMITY - (2/CA)

## (undated) DEVICE — KIT ANESTHESIA W/CIRCUIT & 3/LT BAG W/FILTER (20EA/CA)

## (undated) DEVICE — SUTURE 3-0 VICRYL PLUS SH - 8X 18 INCH (12/BX)

## (undated) DEVICE — TOURNIQUET CUFF 24 X 4 ONE PORT - STERILE (10/BX)

## (undated) DEVICE — BLOCK

## (undated) DEVICE — HUMID-VENT HEAT AND MOISTURE EXCHANGE- (50/BX)

## (undated) DEVICE — TUBING PUMP WITH CONNECTOR REDEUCE (1EA)

## (undated) DEVICE — SHAVER 2.5 SM JT AGGRESS MEN. (5EA/BX)

## (undated) DEVICE — STAPLER SKIN DISP - (6/BX 10BX/CA) VISISTAT

## (undated) DEVICE — GLOVE BIOGEL INDICATOR SZ 8 SURGICAL PF LTX - (50/BX 4BX/CA)

## (undated) DEVICE — GLOVE BIOGEL SZ 7.5 SURGICAL PF LTX - (50PR/BX 4BX/CA)

## (undated) DEVICE — SUTURE 4-0 ETHILON PS-2 18 (12PK/BX)"

## (undated) DEVICE — GLOVE BIOGEL PI ORTHO SZ 8 PF LF (40PR/BX)

## (undated) DEVICE — COVER LIGHT HANDLE FLEXIBLE - SOFT (2EA/PK 80PK/CA)

## (undated) DEVICE — TOURNIQUET CUFF 18 X 3 ONE PORT DISP - STERILE (10/BX)

## (undated) DEVICE — BLADE BEAVER 6400 MINI EYE ROUND TIP SHARP ON ONE SIDE (20/CA)

## (undated) DEVICE — SUTURE 4-0 MONOCRYL PLUS PS-2 - 27 INCH (36/BX)

## (undated) DEVICE — SHAVER 3.5 SM JT AGGRES.MEN. (5EA/BX)

## (undated) DEVICE — GLOVE BIOGEL PI ULTRATOUCH SZ 8.5 SURGICAL PF LF - (200PR/CA)

## (undated) DEVICE — WRAP CO-FLEX 4IN X 5YD STERIL - SELF-ADHERENT (18/CA)

## (undated) DEVICE — ADHESIVE MASTISOL - (48/BX)

## (undated) DEVICE — BAG SPONGE COUNT 10.25 X 32 - BLUE (250/CA)

## (undated) DEVICE — SUTURE 2-0 VICRYL PLUS SH - 8 X 18 INCH (12/BX)

## (undated) DEVICE — BANDAGE ELASTIC 6 IN X 5 YDS - LATEX FREE (10/BX)

## (undated) DEVICE — SPIDER SHOULDER HOLDER (12EA/BX)

## (undated) DEVICE — STERI STRIP COMPOUND BENZOIN - TINCTURE 0.6ML WITH APPLICATOR (40EA/BX)